# Patient Record
Sex: FEMALE | Race: WHITE | Employment: FULL TIME | URBAN - METROPOLITAN AREA
[De-identification: names, ages, dates, MRNs, and addresses within clinical notes are randomized per-mention and may not be internally consistent; named-entity substitution may affect disease eponyms.]

---

## 2017-02-06 ENCOUNTER — OFFICE VISIT (OUTPATIENT)
Dept: FAMILY MEDICINE CLINIC | Facility: CLINIC | Age: 42
End: 2017-02-06

## 2017-02-06 VITALS
HEIGHT: 63 IN | TEMPERATURE: 99 F | WEIGHT: 157.19 LBS | DIASTOLIC BLOOD PRESSURE: 70 MMHG | SYSTOLIC BLOOD PRESSURE: 120 MMHG | BODY MASS INDEX: 27.85 KG/M2 | HEART RATE: 64 BPM

## 2017-02-06 DIAGNOSIS — E55.9 VITAMIN D DEFICIENCY: ICD-10-CM

## 2017-02-06 DIAGNOSIS — Z00.00 ENCOUNTER FOR ROUTINE ADULT HEALTH EXAMINATION WITHOUT ABNORMAL FINDINGS: ICD-10-CM

## 2017-02-06 DIAGNOSIS — E78.49 FAMILIAL MULTIPLE LIPOPROTEIN-TYPE HYPERLIPIDEMIA: Primary | ICD-10-CM

## 2017-02-06 DIAGNOSIS — D50.9 IRON DEFICIENCY ANEMIA, UNSPECIFIED IRON DEFICIENCY ANEMIA TYPE: ICD-10-CM

## 2017-02-06 PROBLEM — N88.8 CYST OF CERVIX: Status: ACTIVE | Noted: 2017-02-06

## 2017-02-06 PROCEDURE — 99396 PREV VISIT EST AGE 40-64: CPT | Performed by: FAMILY MEDICINE

## 2017-02-06 PROCEDURE — 90715 TDAP VACCINE 7 YRS/> IM: CPT | Performed by: FAMILY MEDICINE

## 2017-02-06 PROCEDURE — 93000 ELECTROCARDIOGRAM COMPLETE: CPT | Performed by: FAMILY MEDICINE

## 2017-02-06 PROCEDURE — 90471 IMMUNIZATION ADMIN: CPT | Performed by: FAMILY MEDICINE

## 2017-02-06 RX ORDER — ATORVASTATIN CALCIUM 10 MG/1
1 TABLET, FILM COATED ORAL DAILY
COMMUNITY
Start: 2016-06-23 | End: 2017-02-11

## 2017-02-06 RX ORDER — CHLORAL HYDRATE 500 MG
1 CAPSULE ORAL DAILY
COMMUNITY
Start: 2015-09-28 | End: 2019-09-16

## 2017-02-06 RX ORDER — FERROUS SULFATE 325(65) MG
1 TABLET ORAL 2 TIMES DAILY
COMMUNITY
Start: 2015-10-08

## 2017-02-06 RX ORDER — ALPHA LIPOIC ACID 200 MG
1 CAPSULE ORAL DAILY
COMMUNITY
Start: 2015-10-08 | End: 2020-07-28

## 2017-02-06 RX ORDER — MELATONIN
1 DAILY
COMMUNITY
Start: 2015-09-28 | End: 2018-04-02

## 2017-02-06 NOTE — PATIENT INSTRUCTIONS
Call [facility] to schedule a Mammogram at [phone]   Schedule labs. Tetanus today. Refer to [facility] [phone] for colonosocpy due to higher risk.

## 2017-02-06 NOTE — PROGRESS NOTES
[facility] [place]  PROGRESS NOTE        HPI:   This is a 39year old female coming in for Patient presents with:  Physical    HPI    No results found for this or any previous visit.     Past Medical History   Diagnosis Date   • Allergic rhinitis Administered    TDAP                  02/06/2017    Problem List:  Patient Active Problem List:     Cyst of cervix     Familial multiple lipoprotein-type hyperlipidemia     Iron deficiency anemia     B12 deficiency     Vitamin D deficiency      REVIEW OF S Oriented times three,cranial nerves are intact,motor and sensory are grossly intact      ASSESSMENT AND PLAN:   1. Familial multiple lipoprotein-type hyperlipidemia  Await labs. Refill meds. Continue present management.   - CBC W Differential W Platelet [ any questions, complications, allergies, or worsening or changing symptoms. Parent is to call with any side effects or complications from the treatments as a result of today.        Wanda Abarca MD  2/6/2017  2:56 PM    There is no immunization history on

## 2017-02-11 RX ORDER — ATORVASTATIN CALCIUM 10 MG/1
TABLET, FILM COATED ORAL
Qty: 30 TABLET | Refills: 0 | Status: SHIPPED | OUTPATIENT
Start: 2017-02-11 | End: 2017-03-14

## 2017-02-11 NOTE — TELEPHONE ENCOUNTER
Patient has lab appt on 2/18/17. Order are in chart. One refill given okay per Dr. Guero Finch.  Rachel Roldan, 02/11/2017, 11:16 AM

## 2017-02-11 NOTE — TELEPHONE ENCOUNTER
Pt is due for follow up appointment. Please have pt  Schedule apptointment . Refills until appointmentt ok.       Nayana Frank MD

## 2017-02-18 ENCOUNTER — LAB ENCOUNTER (OUTPATIENT)
Dept: LAB | Age: 42
End: 2017-02-18
Attending: FAMILY MEDICINE
Payer: COMMERCIAL

## 2017-02-18 DIAGNOSIS — D50.9 IRON DEFICIENCY ANEMIA, UNSPECIFIED IRON DEFICIENCY ANEMIA TYPE: ICD-10-CM

## 2017-02-18 DIAGNOSIS — E78.49 FAMILIAL MULTIPLE LIPOPROTEIN-TYPE HYPERLIPIDEMIA: ICD-10-CM

## 2017-02-18 DIAGNOSIS — E55.9 VITAMIN D DEFICIENCY: ICD-10-CM

## 2017-02-18 LAB
25-HYDROXYVITAMIN D (TOTAL): 29.8 NG/ML (ref 30–100)
ALBUMIN SERPL-MCNC: 3.9 G/DL (ref 3.5–4.8)
ALP LIVER SERPL-CCNC: 49 U/L (ref 37–98)
ALT SERPL-CCNC: 36 U/L (ref 14–54)
AST SERPL-CCNC: 13 U/L (ref 15–41)
BASOPHILS # BLD AUTO: 0.05 X10(3) UL (ref 0–0.1)
BASOPHILS NFR BLD AUTO: 0.7 %
BILIRUB SERPL-MCNC: 0.6 MG/DL (ref 0.1–2)
BILIRUB UR QL STRIP.AUTO: NEGATIVE
BUN BLD-MCNC: 12 MG/DL (ref 8–20)
CALCIUM BLD-MCNC: 8.6 MG/DL (ref 8.3–10.3)
CHLORIDE: 104 MMOL/L (ref 101–111)
CHOLEST SMN-MCNC: 182 MG/DL (ref ?–200)
CK: 82 IU/L (ref 26–192)
CLARITY UR REFRACT.AUTO: CLEAR
CO2: 26 MMOL/L (ref 22–32)
CREAT BLD-MCNC: 0.74 MG/DL (ref 0.55–1.02)
DEPRECATED HBV CORE AB SER IA-ACNC: 47 NG/ML (ref 10–291)
EOSINOPHIL # BLD AUTO: 0.22 X10(3) UL (ref 0–0.3)
EOSINOPHIL NFR BLD AUTO: 3.1 %
ERYTHROCYTE [DISTWIDTH] IN BLOOD BY AUTOMATED COUNT: 13.1 % (ref 11.5–16)
GLUCOSE BLD-MCNC: 85 MG/DL (ref 70–99)
GLUCOSE UR STRIP.AUTO-MCNC: NEGATIVE MG/DL
HAV AB SERPL IA-ACNC: 615 PG/ML (ref 193–986)
HCT VFR BLD AUTO: 36.4 % (ref 34–50)
HDLC SERPL-MCNC: 62 MG/DL (ref 45–?)
HDLC SERPL: 2.94 {RATIO} (ref ?–4.44)
HGB BLD-MCNC: 12.7 G/DL (ref 12–16)
IMMATURE GRANULOCYTE COUNT: 0.01 X10(3) UL (ref 0–1)
IMMATURE GRANULOCYTE RATIO %: 0.1 %
IRON SATURATION: 29 % (ref 13–45)
IRON: 110 UG/DL (ref 28–170)
KETONES UR STRIP.AUTO-MCNC: NEGATIVE MG/DL
LDLC SERPL CALC-MCNC: 92 MG/DL (ref ?–130)
LEUKOCYTE ESTERASE UR QL STRIP.AUTO: NEGATIVE
LYMPHOCYTES # BLD AUTO: 2.45 X10(3) UL (ref 0.9–4)
LYMPHOCYTES NFR BLD AUTO: 34.3 %
M PROTEIN MFR SERPL ELPH: 7.2 G/DL (ref 6.1–8.3)
MCH RBC QN AUTO: 30.2 PG (ref 27–33.2)
MCHC RBC AUTO-ENTMCNC: 34.9 G/DL (ref 31–37)
MCV RBC AUTO: 86.5 FL (ref 81–100)
MONOCYTES # BLD AUTO: 0.4 X10(3) UL (ref 0.1–0.6)
MONOCYTES NFR BLD AUTO: 5.6 %
NEUTROPHIL ABS PRELIM: 4.01 X10 (3) UL (ref 1.3–6.7)
NEUTROPHILS # BLD AUTO: 4.01 X10(3) UL (ref 1.3–6.7)
NEUTROPHILS NFR BLD AUTO: 56.2 %
NITRITE UR QL STRIP.AUTO: NEGATIVE
NONHDLC SERPL-MCNC: 120 MG/DL (ref ?–130)
PH UR STRIP.AUTO: 6 [PH] (ref 4.5–8)
PLATELET # BLD AUTO: 196 10(3)UL (ref 150–450)
POTASSIUM SERPL-SCNC: 3.8 MMOL/L (ref 3.6–5.1)
PROT UR STRIP.AUTO-MCNC: NEGATIVE MG/DL
RBC # BLD AUTO: 4.21 X10(6)UL (ref 3.8–5.1)
RBC UR QL AUTO: NEGATIVE
RED CELL DISTRIBUTION WIDTH-SD: 40.5 FL (ref 35.1–46.3)
SODIUM SERPL-SCNC: 139 MMOL/L (ref 136–144)
SP GR UR STRIP.AUTO: 1.01 (ref 1–1.03)
TOTAL IRON BINDING CAPACITY: 374 UG/DL (ref 298–536)
TRANSFERRIN: 251 MG/DL (ref 200–360)
TRIGLYCERIDES: 141 MG/DL (ref ?–150)
TSI SER-ACNC: 1.1 MIU/ML (ref 0.35–5.5)
URIC ACID: 3.7 MG/DL (ref 2.4–8)
UROBILINOGEN UR STRIP.AUTO-MCNC: <2 MG/DL
VLDL: 28 MG/DL (ref 5–40)
WBC # BLD AUTO: 7.1 X10(3) UL (ref 4–13)

## 2017-02-18 PROCEDURE — 84550 ASSAY OF BLOOD/URIC ACID: CPT

## 2017-02-18 PROCEDURE — 80053 COMPREHEN METABOLIC PANEL: CPT

## 2017-02-18 PROCEDURE — 81003 URINALYSIS AUTO W/O SCOPE: CPT

## 2017-02-18 PROCEDURE — 83540 ASSAY OF IRON: CPT

## 2017-02-18 PROCEDURE — 82607 VITAMIN B-12: CPT

## 2017-02-18 PROCEDURE — 85025 COMPLETE CBC W/AUTO DIFF WBC: CPT

## 2017-02-18 PROCEDURE — 84443 ASSAY THYROID STIM HORMONE: CPT

## 2017-02-18 PROCEDURE — 83550 IRON BINDING TEST: CPT

## 2017-02-18 PROCEDURE — 82550 ASSAY OF CK (CPK): CPT

## 2017-02-18 PROCEDURE — 82728 ASSAY OF FERRITIN: CPT

## 2017-02-18 PROCEDURE — 80061 LIPID PANEL: CPT

## 2017-02-18 PROCEDURE — 82306 VITAMIN D 25 HYDROXY: CPT

## 2017-02-20 ENCOUNTER — TELEPHONE (OUTPATIENT)
Dept: FAMILY MEDICINE CLINIC | Facility: CLINIC | Age: 42
End: 2017-02-20

## 2017-02-20 DIAGNOSIS — E55.9 VITAMIN D DEFICIENCY: Primary | ICD-10-CM

## 2017-02-20 NOTE — TELEPHONE ENCOUNTER
----- Message from Jamal Vargas MD sent at 2/20/2017  8:53 AM CST -----  Vitamin D is sub therapeutic. Recommend 2000 units of vitamin D daily  Recheck vitamin D level in 6  months. Otherwise labs look great!

## 2017-02-20 NOTE — TELEPHONE ENCOUNTER
Patient notified of results and recommendations and expressed understanding. Order in for repeat b/w.     Leno Roldan, 02/20/2017, 12:13 PM

## 2017-03-14 RX ORDER — ATORVASTATIN CALCIUM 10 MG/1
TABLET, FILM COATED ORAL
Qty: 30 TABLET | Refills: 5 | Status: SHIPPED | OUTPATIENT
Start: 2017-03-14 | End: 2017-10-02

## 2017-05-09 ENCOUNTER — TELEPHONE (OUTPATIENT)
Dept: FAMILY MEDICINE CLINIC | Facility: CLINIC | Age: 42
End: 2017-05-09

## 2017-05-10 NOTE — TELEPHONE ENCOUNTER
Unable to add colonoscopy to the flowsheet as there is not one  Dr. Jolene Torres was able to add to patient's health maintenance  Note closed    Pat See Jamar, 05/10/2017, 8:45 AM

## 2017-10-02 RX ORDER — ATORVASTATIN CALCIUM 10 MG/1
TABLET, FILM COATED ORAL
Qty: 30 TABLET | Refills: 0 | Status: SHIPPED | OUTPATIENT
Start: 2017-10-02 | End: 2017-10-30

## 2017-10-02 NOTE — TELEPHONE ENCOUNTER
Pt is due for follow up appointment and fasting labs. Please have pt  Schedule apptointment . Refills until appointmentt ok.     Diallo Barboza MD

## 2017-10-02 NOTE — TELEPHONE ENCOUNTER
Future appt:    Last Appointment:  Visit date not found  Last ov with pcp on 2/6/17     Cholesterol, Total (mg/dL)   Date Value   02/18/2017 182   ----------  HDL Cholesterol (mg/dL)   Date Value   02/18/2017 62   ----------  LDL Cholesterol (mg/dL)   Date

## 2017-10-04 NOTE — TELEPHONE ENCOUNTER
Patient informed of the below results and recommendations. Patient will c/b to schedule the appropriate appts.

## 2017-10-23 ENCOUNTER — APPOINTMENT (OUTPATIENT)
Dept: LAB | Age: 42
End: 2017-10-23
Attending: FAMILY MEDICINE
Payer: COMMERCIAL

## 2017-10-23 DIAGNOSIS — E55.9 VITAMIN D DEFICIENCY: ICD-10-CM

## 2017-10-23 PROCEDURE — 82306 VITAMIN D 25 HYDROXY: CPT | Performed by: FAMILY MEDICINE

## 2017-10-23 PROCEDURE — 36415 COLL VENOUS BLD VENIPUNCTURE: CPT | Performed by: FAMILY MEDICINE

## 2017-10-24 ENCOUNTER — TELEPHONE (OUTPATIENT)
Dept: FAMILY MEDICINE CLINIC | Facility: CLINIC | Age: 42
End: 2017-10-24

## 2017-10-24 DIAGNOSIS — E55.9 VITAMIN D DEFICIENCY: Primary | ICD-10-CM

## 2017-10-24 NOTE — TELEPHONE ENCOUNTER
Patient notified of results and recommendations and expressed understanding.     Guero Roldan, 10/24/17, 10:45 AM

## 2017-10-24 NOTE — TELEPHONE ENCOUNTER
----- Message from Ag Apodaca MD sent at 10/24/2017  8:35 AM CDT -----  Improving. Continue present managment   Recheck in 1 year.

## 2017-10-30 RX ORDER — ATORVASTATIN CALCIUM 10 MG/1
TABLET, FILM COATED ORAL
Qty: 30 TABLET | Refills: 0 | Status: SHIPPED | OUTPATIENT
Start: 2017-10-30 | End: 2017-12-03

## 2017-10-30 NOTE — TELEPHONE ENCOUNTER
Future appt:    Last Appointment:  2/6/17 with Dr. Janie Perrin for physical    Cholesterol, Total (mg/dL)   Date Value   02/18/2017 182   ----------  HDL Cholesterol (mg/dL)   Date Value   02/18/2017 62   ----------  LDL Cholesterol (mg/dL)   Date Value   02/18/2

## 2017-10-30 NOTE — TELEPHONE ENCOUNTER
Pt is due for follow up appointment. Please have pt  Schedule apptointment . Refills until appointmentt ok.     Faina Escalona MD

## 2017-12-04 RX ORDER — ATORVASTATIN CALCIUM 10 MG/1
TABLET, FILM COATED ORAL
Qty: 30 TABLET | Refills: 0 | Status: SHIPPED | OUTPATIENT
Start: 2017-12-04 | End: 2018-01-11

## 2017-12-04 NOTE — TELEPHONE ENCOUNTER
Future appt:     Your appointments     Date & Time Appointment Department [facility])    Dec 13, 2017  3:15 PM CST Physical - Established Patient with Cesia Isbell MD [address] [facility] [place] ([facility])        Le Mckee

## 2018-01-11 RX ORDER — ATORVASTATIN CALCIUM 10 MG/1
TABLET, FILM COATED ORAL
Qty: 30 TABLET | Refills: 1 | Status: SHIPPED | OUTPATIENT
Start: 2018-01-11 | End: 2018-03-17

## 2018-01-11 NOTE — TELEPHONE ENCOUNTER
Dr. Janie Perrin is out of the office today. Refill done. Please let patient know that she is due for recheck in February.    Wendi Agrawal, 01/11/18, 11:03 AM

## 2018-01-11 NOTE — TELEPHONE ENCOUNTER
Future appt:  None  Last Appointment:  2/6/2017; No f/u recommended    Cholesterol, Total (mg/dL)   Date Value   02/18/2017 182   ----------  HDL Cholesterol (mg/dL)   Date Value   02/18/2017 62   ----------  LDL Cholesterol (mg/dL)   Date Value   02/18/20

## 2018-02-08 ENCOUNTER — TELEPHONE (OUTPATIENT)
Dept: FAMILY MEDICINE CLINIC | Facility: CLINIC | Age: 43
End: 2018-02-08

## 2018-02-08 DIAGNOSIS — E55.9 VITAMIN D DEFICIENCY: ICD-10-CM

## 2018-02-08 DIAGNOSIS — E78.49 FAMILIAL MULTIPLE LIPOPROTEIN-TYPE HYPERLIPIDEMIA: Primary | ICD-10-CM

## 2018-02-08 DIAGNOSIS — D50.9 IRON DEFICIENCY ANEMIA, UNSPECIFIED IRON DEFICIENCY ANEMIA TYPE: ICD-10-CM

## 2018-02-08 DIAGNOSIS — E53.8 B12 DEFICIENCY: ICD-10-CM

## 2018-02-08 NOTE — TELEPHONE ENCOUNTER
Needing to schedule some appts, but wants to make sure the correct things are ordered--she states last time everything was messed up. Please call back.

## 2018-03-17 RX ORDER — ATORVASTATIN CALCIUM 10 MG/1
TABLET, FILM COATED ORAL
Qty: 30 TABLET | Refills: 0 | Status: SHIPPED | OUTPATIENT
Start: 2018-03-17 | End: 2018-03-26

## 2018-03-17 NOTE — TELEPHONE ENCOUNTER
Future appt:     Your appointments     Date & Time Appointment Department [facility])    Mar 26, 2018  8:45 AM CDT Laboratory Visit with REF [facility] ([facility] [place])    Apr 02, 2018  3:30 PM CDT Physical - Established Patient with Sabrina Boyce

## 2018-03-26 ENCOUNTER — TELEPHONE (OUTPATIENT)
Dept: FAMILY MEDICINE CLINIC | Facility: CLINIC | Age: 43
End: 2018-03-26

## 2018-03-26 ENCOUNTER — LABORATORY ENCOUNTER (OUTPATIENT)
Dept: LAB | Age: 43
End: 2018-03-26
Attending: FAMILY MEDICINE
Payer: COMMERCIAL

## 2018-03-26 DIAGNOSIS — E78.49 FAMILIAL MULTIPLE LIPOPROTEIN-TYPE HYPERLIPIDEMIA: ICD-10-CM

## 2018-03-26 DIAGNOSIS — E55.9 VITAMIN D DEFICIENCY: Primary | ICD-10-CM

## 2018-03-26 DIAGNOSIS — D50.9 IRON DEFICIENCY ANEMIA, UNSPECIFIED IRON DEFICIENCY ANEMIA TYPE: ICD-10-CM

## 2018-03-26 DIAGNOSIS — E55.9 VITAMIN D DEFICIENCY: ICD-10-CM

## 2018-03-26 DIAGNOSIS — E78.5 HYPERLIPIDEMIA, UNSPECIFIED HYPERLIPIDEMIA TYPE: ICD-10-CM

## 2018-03-26 DIAGNOSIS — R74.8 ELEVATED VITAMIN B12 LEVEL: ICD-10-CM

## 2018-03-26 DIAGNOSIS — E53.8 B12 DEFICIENCY: ICD-10-CM

## 2018-03-26 LAB
25-HYDROXYVITAMIN D (TOTAL): 29.1 NG/ML (ref 30–100)
ALBUMIN SERPL-MCNC: 4 G/DL (ref 3.5–4.8)
ALP LIVER SERPL-CCNC: 70 U/L (ref 37–98)
ALT SERPL-CCNC: 22 U/L (ref 14–54)
AST SERPL-CCNC: 13 U/L (ref 15–41)
BASOPHILS # BLD AUTO: 0.05 X10(3) UL (ref 0–0.1)
BASOPHILS NFR BLD AUTO: 0.7 %
BILIRUB SERPL-MCNC: 0.6 MG/DL (ref 0.1–2)
BILIRUB UR QL STRIP.AUTO: NEGATIVE
BUN BLD-MCNC: 10 MG/DL (ref 8–20)
CALCIUM BLD-MCNC: 9.1 MG/DL (ref 8.3–10.3)
CHLORIDE: 102 MMOL/L (ref 101–111)
CHOLEST SMN-MCNC: 202 MG/DL (ref ?–200)
CLARITY UR REFRACT.AUTO: CLEAR
CO2: 31 MMOL/L (ref 22–32)
COLOR UR AUTO: YELLOW
CREAT BLD-MCNC: 0.8 MG/DL (ref 0.55–1.02)
DEPRECATED HBV CORE AB SER IA-ACNC: 37.3 NG/ML (ref 10–291)
EOSINOPHIL # BLD AUTO: 0.27 X10(3) UL (ref 0–0.3)
EOSINOPHIL NFR BLD AUTO: 3.8 %
ERYTHROCYTE [DISTWIDTH] IN BLOOD BY AUTOMATED COUNT: 13.2 % (ref 11.5–16)
GLUCOSE BLD-MCNC: 101 MG/DL (ref 70–99)
GLUCOSE UR STRIP.AUTO-MCNC: NEGATIVE MG/DL
HAV AB SERPL IA-ACNC: >2000 PG/ML (ref 193–986)
HCT VFR BLD AUTO: 43.4 % (ref 34–50)
HDLC SERPL-MCNC: 58 MG/DL (ref 45–?)
HDLC SERPL: 3.48 {RATIO} (ref ?–4.44)
HGB BLD-MCNC: 14.1 G/DL (ref 12–16)
IMMATURE GRANULOCYTE COUNT: 0.02 X10(3) UL (ref 0–1)
IMMATURE GRANULOCYTE RATIO %: 0.3 %
KETONES UR STRIP.AUTO-MCNC: NEGATIVE MG/DL
LDLC SERPL CALC-MCNC: 108 MG/DL (ref ?–130)
LEUKOCYTE ESTERASE UR QL STRIP.AUTO: NEGATIVE
LYMPHOCYTES # BLD AUTO: 2.04 X10(3) UL (ref 0.9–4)
LYMPHOCYTES NFR BLD AUTO: 28.9 %
M PROTEIN MFR SERPL ELPH: 7.8 G/DL (ref 6.1–8.3)
MCH RBC QN AUTO: 28.6 PG (ref 27–33.2)
MCHC RBC AUTO-ENTMCNC: 32.5 G/DL (ref 31–37)
MCV RBC AUTO: 88 FL (ref 81–100)
MONOCYTES # BLD AUTO: 0.37 X10(3) UL (ref 0.1–1)
MONOCYTES NFR BLD AUTO: 5.2 %
NEUTROPHIL ABS PRELIM: 4.32 X10 (3) UL (ref 1.3–6.7)
NEUTROPHILS # BLD AUTO: 4.32 X10(3) UL (ref 1.3–6.7)
NEUTROPHILS NFR BLD AUTO: 61.1 %
NITRITE UR QL STRIP.AUTO: NEGATIVE
NONHDLC SERPL-MCNC: 144 MG/DL (ref ?–130)
PH UR STRIP.AUTO: 7 [PH] (ref 4.5–8)
PLATELET # BLD AUTO: 266 10(3)UL (ref 150–450)
POTASSIUM SERPL-SCNC: 3.5 MMOL/L (ref 3.6–5.1)
PROT UR STRIP.AUTO-MCNC: NEGATIVE MG/DL
RBC # BLD AUTO: 4.93 X10(6)UL (ref 3.8–5.1)
RBC UR QL AUTO: NEGATIVE
RED CELL DISTRIBUTION WIDTH-SD: 42.8 FL (ref 35.1–46.3)
SODIUM SERPL-SCNC: 137 MMOL/L (ref 136–144)
SP GR UR STRIP.AUTO: 1.01 (ref 1–1.03)
TRIGL SERPL-MCNC: 179 MG/DL (ref ?–150)
TSI SER-ACNC: 0.79 MIU/ML (ref 0.35–5.5)
UROBILINOGEN UR STRIP.AUTO-MCNC: <2 MG/DL
VLDLC SERPL CALC-MCNC: 36 MG/DL (ref 5–40)
WBC # BLD AUTO: 7.1 X10(3) UL (ref 4–13)

## 2018-03-26 PROCEDURE — 82728 ASSAY OF FERRITIN: CPT | Performed by: FAMILY MEDICINE

## 2018-03-26 PROCEDURE — 80050 GENERAL HEALTH PANEL: CPT | Performed by: FAMILY MEDICINE

## 2018-03-26 PROCEDURE — 82607 VITAMIN B-12: CPT | Performed by: FAMILY MEDICINE

## 2018-03-26 PROCEDURE — 36415 COLL VENOUS BLD VENIPUNCTURE: CPT | Performed by: FAMILY MEDICINE

## 2018-03-26 PROCEDURE — 80061 LIPID PANEL: CPT | Performed by: FAMILY MEDICINE

## 2018-03-26 PROCEDURE — 82306 VITAMIN D 25 HYDROXY: CPT | Performed by: FAMILY MEDICINE

## 2018-03-26 PROCEDURE — 81003 URINALYSIS AUTO W/O SCOPE: CPT | Performed by: FAMILY MEDICINE

## 2018-03-26 RX ORDER — ATORVASTATIN CALCIUM 20 MG/1
20 TABLET, FILM COATED ORAL
Qty: 30 TABLET | Refills: 3 | Status: SHIPPED | OUTPATIENT
Start: 2018-03-26 | End: 2018-09-05

## 2018-03-26 NOTE — TELEPHONE ENCOUNTER
Patient notified of results and recommendations and expressed understanding.   Order in for recheck vitamin levels in 6 months    Patient states she is taking her Lipitor every day    John Roldan, 03/26/18, 5:25 PM

## 2018-03-26 NOTE — TELEPHONE ENCOUNTER
----- Message from Ryder Herrera MD sent at 3/26/2018  4:50 PM CDT -----  b12 is high. Reduce supplementation to every other day. Slightly low potassium. Recommend increase bananas, potassium rich food.    Cholesterol is high, is she taking her lipito

## 2018-03-26 NOTE — TELEPHONE ENCOUNTER
----- Message from Shanthi Ryder MD sent at 3/26/2018  3:37 PM CDT -----  Vitamin D is sub therapeutic. Recommend 2000 units of vitamin D daily  Recheck vitamin D level in 6  months.

## 2018-04-02 ENCOUNTER — OFFICE VISIT (OUTPATIENT)
Dept: FAMILY MEDICINE CLINIC | Facility: CLINIC | Age: 43
End: 2018-04-02

## 2018-04-02 VITALS
RESPIRATION RATE: 14 BRPM | DIASTOLIC BLOOD PRESSURE: 68 MMHG | SYSTOLIC BLOOD PRESSURE: 124 MMHG | OXYGEN SATURATION: 100 % | HEART RATE: 74 BPM | WEIGHT: 162 LBS | BODY MASS INDEX: 28.7 KG/M2 | TEMPERATURE: 98 F | HEIGHT: 63 IN

## 2018-04-02 DIAGNOSIS — E78.49 FAMILIAL MULTIPLE LIPOPROTEIN-TYPE HYPERLIPIDEMIA: Primary | ICD-10-CM

## 2018-04-02 DIAGNOSIS — E01.0 THYROMEGALY: ICD-10-CM

## 2018-04-02 DIAGNOSIS — D50.9 IRON DEFICIENCY ANEMIA, UNSPECIFIED IRON DEFICIENCY ANEMIA TYPE: ICD-10-CM

## 2018-04-02 DIAGNOSIS — N84.1 CERVICAL POLYP: ICD-10-CM

## 2018-04-02 DIAGNOSIS — K90.41 GLUTEN-SENSITIVE ENTEROPATHY: ICD-10-CM

## 2018-04-02 DIAGNOSIS — Z00.00 ENCOUNTER FOR HEALTH MAINTENANCE EXAMINATION IN ADULT: ICD-10-CM

## 2018-04-02 PROBLEM — K21.00 GASTROESOPHAGEAL REFLUX DISEASE WITH ESOPHAGITIS: Status: ACTIVE | Noted: 2018-04-02

## 2018-04-02 PROBLEM — K22.70 BARRETT'S ESOPHAGUS WITHOUT DYSPLASIA: Status: ACTIVE | Noted: 2018-04-02

## 2018-04-02 PROCEDURE — 87624 HPV HI-RISK TYP POOLED RSLT: CPT | Performed by: FAMILY MEDICINE

## 2018-04-02 PROCEDURE — 88175 CYTOPATH C/V AUTO FLUID REDO: CPT | Performed by: FAMILY MEDICINE

## 2018-04-02 PROCEDURE — 99396 PREV VISIT EST AGE 40-64: CPT | Performed by: FAMILY MEDICINE

## 2018-04-02 PROCEDURE — 57500 BIOPSY OF CERVIX: CPT | Performed by: FAMILY MEDICINE

## 2018-04-02 RX ORDER — OMEPRAZOLE 20 MG/1
1 CAPSULE, DELAYED RELEASE ORAL AS NEEDED
Refills: 1 | COMMUNITY
Start: 2018-01-18 | End: 2018-11-29

## 2018-04-02 NOTE — PATIENT INSTRUCTIONS
[employer]. net  (0528 access number) for vitamins   Vitamin D is very low,   Recheck in 3 months. Schedule thyroid US. If thyroid US is abnormal may want to get labs earlier.

## 2018-04-02 NOTE — PROGRESS NOTES
[facility] [place]  PROGRESS NOTE        HPI:   This is a 43year old female coming in for Patient presents with:  Physical    HPI   Has been seeing Dr. Mildred Lynn and has a gluten sensitivity and Bowman's esophagitis. Was started on Prilosec.    Ata Jimenez Negative   Ketones Urine Negative Negative mg/dL   Blood Urine Negative Negative   pH Urine 7.0 4.5 - 8.0   Protein Urine Negative Negative mg/dl   Urobilinogen Urine <2.0 0.2 - 2.0 mg/dL   Nitrite Urine Negative Negative   Leukocyte Esterase Urine Negativ of Children: 3    Oriental orthodox/ Uatsdin: Episcopal    Other Social History Comments:           Smoking status: Never Smoker                                                              Smokeless tobacco: Never Used                      Alcohol use:  No 124/68 (BP Location: Left arm, Patient Position: Sitting, Cuff Size: adult)   Pulse 74   Temp 98.2 °F (36.8 °C) (Temporal)   Resp 14   Ht 63\"   Wt 162 lb   LMP 03/25/2018   SpO2 100%   BMI 28.70 kg/m²  Estimated body mass index is 28.7 kg/m² as calculated THYROGLOBULIN ANTIBODIES; Future  -     URIC ACID, SERUM; Future  -     TSH W REFLEX TO FREE T4; Future   Check genetic component of gluten sensitivity. Thyroid US. Cervical polyp  -     SURGICAL PATHOLOGY TISSUE;  Future      Vitamin d, cmp, ferriti

## 2018-04-05 ENCOUNTER — TELEPHONE (OUTPATIENT)
Dept: FAMILY MEDICINE CLINIC | Facility: CLINIC | Age: 43
End: 2018-04-05

## 2018-04-05 DIAGNOSIS — E01.0 THYROMEGALY: Primary | ICD-10-CM

## 2018-04-05 NOTE — TELEPHONE ENCOUNTER
----- Message from Sole Eden MD sent at 4/3/2018  6:27 PM CDT -----  Normal (hpv) results, please notify patient. \

## 2018-04-05 NOTE — TELEPHONE ENCOUNTER
HPV and Thinprep Pap both normal / negative  Pathology for Cervical Polyp was normal / negative - no malignancy    Patient notified of results and recommendations and expressed understanding.     Cortney Roldan, 04/05/18, 10:45 AM    Patient states she is sup

## 2018-04-23 RX ORDER — ATORVASTATIN CALCIUM 10 MG/1
TABLET, FILM COATED ORAL
Qty: 30 TABLET | Refills: 0 | OUTPATIENT
Start: 2018-04-23

## 2018-04-24 ENCOUNTER — HOSPITAL ENCOUNTER (OUTPATIENT)
Dept: ULTRASOUND IMAGING | Age: 43
Discharge: HOME | End: 2018-04-24
Attending: FAMILY MEDICINE
Payer: COMMERCIAL

## 2018-04-24 ENCOUNTER — TELEPHONE (OUTPATIENT)
Dept: FAMILY MEDICINE CLINIC | Facility: CLINIC | Age: 43
End: 2018-04-24

## 2018-04-24 DIAGNOSIS — E01.0 THYROMEGALY: ICD-10-CM

## 2018-04-24 DIAGNOSIS — E04.1 THYROID NODULE: Primary | ICD-10-CM

## 2018-04-24 PROCEDURE — 76536 US EXAM OF HEAD AND NECK: CPT | Performed by: FAMILY MEDICINE

## 2018-04-24 NOTE — TELEPHONE ENCOUNTER
----- Message from GERMAINE Flores sent at 4/24/2018  1:54 PM CDT -----  Dr. Bolanos Goes is out of the office today. Please have patient see surgeon for a consult for the nodules. Recommend Dr. Chelita Keys or a surgeon of her choice within her insurance.

## 2018-04-27 NOTE — TELEPHONE ENCOUNTER
Patient notified of results and recommendations and expressed understanding.   Referral into chart and faxed to Dr. Catrachito Samaniego with ultrasound report    Guero Roldan, 04/27/18, 8:11 AM

## 2018-05-25 ENCOUNTER — TELEPHONE (OUTPATIENT)
Dept: FAMILY MEDICINE CLINIC | Facility: CLINIC | Age: 43
End: 2018-05-25

## 2018-05-25 NOTE — TELEPHONE ENCOUNTER
Patient received letter for labs that were due in April  Patient also due for labs in June  See lab orders  Patient has lab appt 7/6/8  Patient advised we can do all labs at that appointment  Patient expressed understanding  Patient will come to appointmen

## 2018-07-06 ENCOUNTER — APPOINTMENT (OUTPATIENT)
Dept: LAB | Age: 43
End: 2018-07-06
Attending: FAMILY MEDICINE
Payer: COMMERCIAL

## 2018-07-06 DIAGNOSIS — E78.5 HYPERLIPIDEMIA, UNSPECIFIED HYPERLIPIDEMIA TYPE: ICD-10-CM

## 2018-07-06 DIAGNOSIS — E01.0 THYROMEGALY: ICD-10-CM

## 2018-07-06 DIAGNOSIS — K90.41 GLUTEN-SENSITIVE ENTEROPATHY: ICD-10-CM

## 2018-07-06 LAB
ALBUMIN SERPL-MCNC: 3.7 G/DL (ref 3.5–4.8)
ALP LIVER SERPL-CCNC: 58 U/L (ref 37–98)
ALT SERPL-CCNC: 25 U/L (ref 14–54)
ANTI-THYROGLOBULIN: 21 U/ML (ref ?–60)
ANTI-THYROPEROXIDASE: <28 U/ML (ref ?–60)
AST SERPL-CCNC: 13 U/L (ref 15–41)
BILIRUB SERPL-MCNC: 0.5 MG/DL (ref 0.1–2)
BUN BLD-MCNC: 12 MG/DL (ref 8–20)
CALCIUM BLD-MCNC: 8.8 MG/DL (ref 8.3–10.3)
CHLORIDE: 106 MMOL/L (ref 101–111)
CHOLEST SMN-MCNC: 193 MG/DL (ref ?–200)
CK: 77 IU/L (ref 26–192)
CO2: 26 MMOL/L (ref 22–32)
CREAT BLD-MCNC: 0.77 MG/DL (ref 0.55–1.02)
GLUCOSE BLD-MCNC: 90 MG/DL (ref 70–99)
HDLC SERPL-MCNC: 54 MG/DL (ref 45–?)
HDLC SERPL: 3.57 {RATIO} (ref ?–4.44)
LDLC SERPL CALC-MCNC: 99 MG/DL (ref ?–130)
M PROTEIN MFR SERPL ELPH: 7.2 G/DL (ref 6.1–8.3)
NONHDLC SERPL-MCNC: 139 MG/DL (ref ?–130)
POTASSIUM SERPL-SCNC: 4.2 MMOL/L (ref 3.6–5.1)
SODIUM SERPL-SCNC: 139 MMOL/L (ref 136–144)
TRIGL SERPL-MCNC: 198 MG/DL (ref ?–150)
TSI SER-ACNC: 1.27 MIU/ML (ref 0.35–5.5)
URIC ACID: 4.2 MG/DL (ref 2.4–8)
VLDLC SERPL CALC-MCNC: 40 MG/DL (ref 5–40)

## 2018-07-06 PROCEDURE — 84550 ASSAY OF BLOOD/URIC ACID: CPT | Performed by: FAMILY MEDICINE

## 2018-07-06 PROCEDURE — 82550 ASSAY OF CK (CPK): CPT | Performed by: FAMILY MEDICINE

## 2018-07-06 PROCEDURE — 86800 THYROGLOBULIN ANTIBODY: CPT | Performed by: FAMILY MEDICINE

## 2018-07-06 PROCEDURE — 84443 ASSAY THYROID STIM HORMONE: CPT | Performed by: FAMILY MEDICINE

## 2018-07-06 PROCEDURE — 36415 COLL VENOUS BLD VENIPUNCTURE: CPT | Performed by: FAMILY MEDICINE

## 2018-07-06 PROCEDURE — 80053 COMPREHEN METABOLIC PANEL: CPT | Performed by: FAMILY MEDICINE

## 2018-07-06 PROCEDURE — 86376 MICROSOMAL ANTIBODY EACH: CPT | Performed by: FAMILY MEDICINE

## 2018-07-06 PROCEDURE — 80061 LIPID PANEL: CPT | Performed by: FAMILY MEDICINE

## 2018-07-06 PROCEDURE — 81376 HLA II TYPING 1 LOCUS LR: CPT | Performed by: FAMILY MEDICINE

## 2018-07-07 ENCOUNTER — TELEPHONE (OUTPATIENT)
Dept: FAMILY MEDICINE CLINIC | Facility: CLINIC | Age: 43
End: 2018-07-07

## 2018-07-07 NOTE — TELEPHONE ENCOUNTER
----- Message from Korey Diaz MD sent at 7/7/2018  8:07 AM CDT -----  Normal (thyroid ) results, please notify patient. Lipids are improving.

## 2018-07-07 NOTE — TELEPHONE ENCOUNTER
Patient notified of results and recommendations and expressed understanding.     Kina Roldan, 07/07/18, 11:35 AM

## 2018-07-10 DIAGNOSIS — K90.0 TRANSIENT GLUTEN SENSITIVITY: Primary | ICD-10-CM

## 2018-07-10 LAB
CELIAC (HLA-DQ8): NEGATIVE
CELIAC (HLA-DQA1*05): POSITIVE
CELIAC (HLA-DQB1*02): POSITIVE

## 2018-09-05 DIAGNOSIS — E53.8 B12 DEFICIENCY: Primary | ICD-10-CM

## 2018-09-05 DIAGNOSIS — E55.9 VITAMIN D DEFICIENCY: ICD-10-CM

## 2018-09-06 RX ORDER — ATORVASTATIN CALCIUM 20 MG/1
TABLET, FILM COATED ORAL
Qty: 30 TABLET | Refills: 0 | Status: SHIPPED | OUTPATIENT
Start: 2018-09-06 | End: 2018-10-03

## 2018-09-06 NOTE — TELEPHONE ENCOUNTER
Future appt:     Your appointments     Date & Time Appointment Department [facility])    Sep 08, 2018  8:45 AM CDT Laboratory Visit with REF [facility] ([facility])        [facility]  [facility] [place]

## 2018-09-06 NOTE — TELEPHONE ENCOUNTER
Patient informed of the below recommendations. States she is scheduled to have labs drawn on Saturday, 9/8/2018. Patient states she has written in her notes that she needs to have Vitamin D, Vitamin B12, IGG, and cholesterol drawn.   Please place orders a

## 2018-09-06 NOTE — TELEPHONE ENCOUNTER
Pt is due for follow up appointment and fasting labs    Please have pt  Schedule apptointment according to follow up guidelines  Refills until appointmentt ok.

## 2018-09-08 ENCOUNTER — APPOINTMENT (OUTPATIENT)
Dept: LAB | Age: 43
End: 2018-09-08
Attending: FAMILY MEDICINE
Payer: COMMERCIAL

## 2018-09-08 ENCOUNTER — TELEPHONE (OUTPATIENT)
Dept: FAMILY MEDICINE CLINIC | Facility: CLINIC | Age: 43
End: 2018-09-08

## 2018-09-08 DIAGNOSIS — K90.0 TRANSIENT GLUTEN SENSITIVITY: ICD-10-CM

## 2018-09-08 DIAGNOSIS — E55.9 VITAMIN D DEFICIENCY: ICD-10-CM

## 2018-09-08 DIAGNOSIS — R74.8 ELEVATED VITAMIN B12 LEVEL: ICD-10-CM

## 2018-09-08 DIAGNOSIS — E53.8 B12 DEFICIENCY: Primary | ICD-10-CM

## 2018-09-08 DIAGNOSIS — E53.8 B12 DEFICIENCY: ICD-10-CM

## 2018-09-08 LAB
HAV AB SERPL IA-ACNC: 1034 PG/ML (ref 193–986)
VIT D+METAB SERPL-MCNC: 29.2 NG/ML (ref 30–100)

## 2018-09-08 PROCEDURE — 82306 VITAMIN D 25 HYDROXY: CPT | Performed by: FAMILY MEDICINE

## 2018-09-08 PROCEDURE — 82607 VITAMIN B-12: CPT | Performed by: FAMILY MEDICINE

## 2018-09-08 PROCEDURE — 83516 IMMUNOASSAY NONANTIBODY: CPT | Performed by: FAMILY MEDICINE

## 2018-09-08 PROCEDURE — 36415 COLL VENOUS BLD VENIPUNCTURE: CPT | Performed by: FAMILY MEDICINE

## 2018-09-08 NOTE — TELEPHONE ENCOUNTER
Dr. James Tristan is out of the office today. Lipids were done 2 months ago. Do not need to repeat today. Thank you.

## 2018-09-08 NOTE — TELEPHONE ENCOUNTER
----- Message from Moriah Serrano sent at 9/8/2018  8:56 AM CDT -----  Regarding: lab orders needed   Patient had labs today, Patient would like to know if she needed a Cholesterol test done? Patient was fasting.  If test needs to be added already have that t

## 2018-09-10 LAB
GLIAD (DEAMIDATED) AB, IGA: NEGATIVE
GLIAD (DEAMIDATED) AB, IGG: NEGATIVE

## 2018-09-10 NOTE — TELEPHONE ENCOUNTER
----- Message from Neli Hale MD sent at 9/10/2018 10:07 AM CDT -----  Vitamin D is very low,   Recommend 04954 units of vitamin D weekly x 12 weeks. Recheck vitamin D level in 3 months. Recheck vitamin b12 in 1 year.

## 2018-09-10 NOTE — TELEPHONE ENCOUNTER
Left message for patient to call office regarding lab results    Spoke with patient earlier today   Patient was upset because she had lipid panel done in July but when she asked for a refill of her statin med she was told she needed labs.  Patient states sh

## 2018-09-10 NOTE — TELEPHONE ENCOUNTER
----- Message from Brendon Cobb MD sent at 9/10/2018  3:34 PM CDT -----  Normal (gliadin) results, please notify patient.

## 2018-09-11 LAB — TISSUE TRANSGLUTAMINASE AB,IGG: 1 U/ML

## 2018-09-11 RX ORDER — ERGOCALCIFEROL 1.25 MG/1
50000 CAPSULE ORAL WEEKLY
Qty: 12 CAPSULE | Refills: 0 | Status: SHIPPED | OUTPATIENT
Start: 2018-09-11 | End: 2018-11-28

## 2018-09-11 NOTE — TELEPHONE ENCOUNTER
Patient notified of results and recommendations and expressed understanding.   Script for Vitamin D to [employer] per patient request  Order in for recheck of Vitamin D level    Apologized to patient again for misunderstanding with her labs  Patient

## 2018-09-12 ENCOUNTER — TELEPHONE (OUTPATIENT)
Dept: FAMILY MEDICINE CLINIC | Facility: CLINIC | Age: 43
End: 2018-09-12

## 2018-09-12 NOTE — TELEPHONE ENCOUNTER
----- Message from GERMAINE Flores sent at 9/12/2018  3:44 PM CDT -----  Dr. Demian Goode is out of the office today. Please let patient know that her tissue transglutaminase test is normal. Thank you.

## 2018-10-03 NOTE — TELEPHONE ENCOUNTER
Future appt:    Last Appointment:  4/2/2018; Recheck in 3 months.      Cholesterol, Total (mg/dL)   Date Value   07/06/2018 193     HDL Cholesterol (mg/dL)   Date Value   07/06/2018 54     LDL Cholesterol (mg/dL)   Date Value   07/06/2018 99     Triglycerid

## 2018-10-04 RX ORDER — ATORVASTATIN CALCIUM 20 MG/1
TABLET, FILM COATED ORAL
Qty: 30 TABLET | Refills: 0 | Status: SHIPPED | OUTPATIENT
Start: 2018-10-04 | End: 2018-11-05

## 2018-10-24 ENCOUNTER — TELEPHONE (OUTPATIENT)
Dept: FAMILY MEDICINE CLINIC | Facility: CLINIC | Age: 43
End: 2018-10-24

## 2018-10-24 NOTE — TELEPHONE ENCOUNTER
Per RF request from 10/4/2018, patient is due for a an appt and fasting labs. Patient states she just had labs drawn in September which she came fasting for but was told that it was too soon to check her cholesterol level again.   Labs prior to this was in

## 2018-10-25 NOTE — TELEPHONE ENCOUNTER
Phone call to patient. She is due for an office visit. Her labs will be due in January   Discussed with patient.

## 2018-11-05 NOTE — TELEPHONE ENCOUNTER
Last visit 4/2/18 for px   pt dues for appt    Last refill 10/4/18        Future appt:    Last Appointment:  Visit date not found  Cholesterol, Total (mg/dL)   Date Value   07/06/2018 193     HDL Cholesterol (mg/dL)   Date Value   07/06/2018 54     LDL Cho

## 2018-11-06 RX ORDER — ATORVASTATIN CALCIUM 20 MG/1
TABLET, FILM COATED ORAL
Qty: 30 TABLET | Refills: 0 | Status: SHIPPED | OUTPATIENT
Start: 2018-11-06 | End: 2018-11-29

## 2018-11-06 NOTE — TELEPHONE ENCOUNTER
Pt is due for follow up appointment in october and/or  fasting labs  cmp and lipids. Please have pt  schedule apptointment according to follow up guidelines. Refills until appointmentt ok.

## 2018-11-07 NOTE — TELEPHONE ENCOUNTER
Pt scheduled for appointment with Dr. France Escalona.      Future Appointments   Date Time Provider Rachel Felipa   11/29/2018  4:00 PM Aniceto Sorto MD [facility] [place]

## 2018-11-29 ENCOUNTER — OFFICE VISIT (OUTPATIENT)
Dept: FAMILY MEDICINE CLINIC | Facility: CLINIC | Age: 43
End: 2018-11-29
Payer: COMMERCIAL

## 2018-11-29 VITALS
DIASTOLIC BLOOD PRESSURE: 80 MMHG | WEIGHT: 162 LBS | HEIGHT: 64.5 IN | BODY MASS INDEX: 27.32 KG/M2 | RESPIRATION RATE: 16 BRPM | SYSTOLIC BLOOD PRESSURE: 122 MMHG | TEMPERATURE: 98 F | HEART RATE: 66 BPM

## 2018-11-29 DIAGNOSIS — D50.9 IRON DEFICIENCY ANEMIA, UNSPECIFIED IRON DEFICIENCY ANEMIA TYPE: ICD-10-CM

## 2018-11-29 DIAGNOSIS — E78.49 FAMILIAL MULTIPLE LIPOPROTEIN-TYPE HYPERLIPIDEMIA: ICD-10-CM

## 2018-11-29 DIAGNOSIS — K22.70 BARRETT'S ESOPHAGUS WITHOUT DYSPLASIA: ICD-10-CM

## 2018-11-29 DIAGNOSIS — E53.8 B12 DEFICIENCY: Primary | ICD-10-CM

## 2018-11-29 DIAGNOSIS — K21.00 GASTROESOPHAGEAL REFLUX DISEASE WITH ESOPHAGITIS: ICD-10-CM

## 2018-11-29 DIAGNOSIS — E55.9 VITAMIN D DEFICIENCY: ICD-10-CM

## 2018-11-29 PROCEDURE — 99214 OFFICE O/P EST MOD 30 MIN: CPT | Performed by: FAMILY MEDICINE

## 2018-11-29 RX ORDER — OMEPRAZOLE 20 MG/1
20 CAPSULE, DELAYED RELEASE ORAL AS NEEDED
Qty: 90 CAPSULE | Refills: 1 | Status: SHIPPED | OUTPATIENT
Start: 2018-11-29 | End: 2020-09-17

## 2018-11-29 RX ORDER — ATORVASTATIN CALCIUM 20 MG/1
TABLET, FILM COATED ORAL
Qty: 90 TABLET | Refills: 1 | Status: SHIPPED | OUTPATIENT
Start: 2018-11-29 | End: 2019-07-05

## 2018-11-29 NOTE — PROGRESS NOTES
[facility] [place]  PROGRESS NOTE        HPI:   This is a 37year old female coming in for Patient presents with:  Medication Follow-Up  Other: right arm pain persists sometimes    HPI  She notes that she just has some issues with her right arm. healthy       Number of Children: 3      Lutheran/ Sikhism: Voodoo      Other Social History Comments:     Social History    Tobacco Use      Smoking status: Never Smoker      Smokeless tobacco: Never Used    Alcohol use: No      Alcohol/week: 0.0 oz Location: Left arm, Patient Position: Sitting, Cuff Size: adult)   Pulse 66   Temp 98.2 °F (36.8 °C) (Temporal)   Resp 16   Ht 64.5\"   Wt 162 lb   LMP 11/03/2018   BMI 27.38 kg/m²  Estimated body mass index is 27.38 kg/m² as calculated from the following: Assessment & Plan notes found for this encounter. future appointment:    No Follow-up on file.     Meds & Refills for this Visit:  Requested Prescriptions      No prescriptions requested or ordered in this encounter       Outcome: Parent verbalizes un

## 2018-11-30 RX ORDER — ERGOCALCIFEROL 1.25 MG/1
CAPSULE ORAL
Qty: 12 CAPSULE | Refills: 0 | OUTPATIENT
Start: 2018-11-30

## 2018-12-15 ENCOUNTER — LABORATORY ENCOUNTER (OUTPATIENT)
Dept: LAB | Age: 43
End: 2018-12-15
Attending: FAMILY MEDICINE
Payer: COMMERCIAL

## 2018-12-15 DIAGNOSIS — E78.49 FAMILIAL MULTIPLE LIPOPROTEIN-TYPE HYPERLIPIDEMIA: ICD-10-CM

## 2018-12-15 DIAGNOSIS — E55.9 VITAMIN D DEFICIENCY: ICD-10-CM

## 2018-12-15 DIAGNOSIS — E53.8 B12 DEFICIENCY: ICD-10-CM

## 2018-12-15 DIAGNOSIS — D50.9 IRON DEFICIENCY ANEMIA, UNSPECIFIED IRON DEFICIENCY ANEMIA TYPE: ICD-10-CM

## 2018-12-15 PROCEDURE — 81003 URINALYSIS AUTO W/O SCOPE: CPT | Performed by: FAMILY MEDICINE

## 2018-12-15 PROCEDURE — 83550 IRON BINDING TEST: CPT | Performed by: FAMILY MEDICINE

## 2018-12-15 PROCEDURE — 84550 ASSAY OF BLOOD/URIC ACID: CPT | Performed by: FAMILY MEDICINE

## 2018-12-15 PROCEDURE — 82607 VITAMIN B-12: CPT | Performed by: FAMILY MEDICINE

## 2018-12-15 PROCEDURE — 85025 COMPLETE CBC W/AUTO DIFF WBC: CPT | Performed by: FAMILY MEDICINE

## 2018-12-15 PROCEDURE — 82728 ASSAY OF FERRITIN: CPT | Performed by: FAMILY MEDICINE

## 2018-12-15 PROCEDURE — 82306 VITAMIN D 25 HYDROXY: CPT | Performed by: FAMILY MEDICINE

## 2018-12-15 PROCEDURE — 80061 LIPID PANEL: CPT | Performed by: FAMILY MEDICINE

## 2018-12-15 PROCEDURE — 80053 COMPREHEN METABOLIC PANEL: CPT | Performed by: FAMILY MEDICINE

## 2018-12-15 PROCEDURE — 36415 COLL VENOUS BLD VENIPUNCTURE: CPT | Performed by: FAMILY MEDICINE

## 2018-12-15 PROCEDURE — 82550 ASSAY OF CK (CPK): CPT | Performed by: FAMILY MEDICINE

## 2018-12-15 PROCEDURE — 83540 ASSAY OF IRON: CPT | Performed by: FAMILY MEDICINE

## 2018-12-17 ENCOUNTER — TELEPHONE (OUTPATIENT)
Dept: FAMILY MEDICINE CLINIC | Facility: CLINIC | Age: 43
End: 2018-12-17

## 2018-12-17 DIAGNOSIS — E55.9 VITAMIN D DEFICIENCY: Primary | ICD-10-CM

## 2018-12-17 DIAGNOSIS — D50.9 IRON DEFICIENCY ANEMIA, UNSPECIFIED IRON DEFICIENCY ANEMIA TYPE: ICD-10-CM

## 2018-12-17 NOTE — TELEPHONE ENCOUNTER
----- Message from Curt Strickland MD sent at 12/17/2018  8:37 AM CST -----  Vitamin D is now doing well. Recommend 2000 units of vitamin D daily, increase if already taking. Goal of 51 for vitamin D   Recheck vitamin D level in 6  months.

## 2018-12-17 NOTE — TELEPHONE ENCOUNTER
----- Message from Marquis Drake MD sent at 12/17/2018  8:36 AM CST -----  Recommend iron supplementation. Increase to bid. Consider change supplier. Take iron with acidic juice. follow up iron profile, saturations and ferritin fasting in 3 months.

## 2018-12-17 NOTE — TELEPHONE ENCOUNTER
----- Message from Mir Lovelace MD sent at 12/17/2018  8:37 AM CST -----  Vitamin B12 is still high, but not as high as previously. No further supplementation at this time. Recheck in 1 year.

## 2018-12-17 NOTE — TELEPHONE ENCOUNTER
Patient notified of results and recommendations and expressed understanding. Orders in for recheck of bloodwork  Patient given information for [employer]. net    Laly Drain, 12/17/18, 2:08 PM

## 2019-07-05 DIAGNOSIS — E78.49 FAMILIAL MULTIPLE LIPOPROTEIN-TYPE HYPERLIPIDEMIA: Primary | ICD-10-CM

## 2019-07-06 NOTE — TELEPHONE ENCOUNTER
Refill request from [address] [place] [address] for atorvastatin. Last office visit followup with Dr. Karen Fontaine on 11/29/18. Pateint was to have follow up labs in March for iron studdies and vitamin D level. No appts scheduled.    Future appt:    Last Appointment:

## 2019-07-06 NOTE — TELEPHONE ENCOUNTER
Patient returned call. She scheduled medication follow up. States she can wait for Dr. Carnella Bernheim return on Monday for refill.

## 2019-07-08 RX ORDER — ATORVASTATIN CALCIUM 20 MG/1
TABLET, FILM COATED ORAL
Qty: 90 TABLET | Refills: 1 | Status: SHIPPED | OUTPATIENT
Start: 2019-07-08 | End: 2019-09-16

## 2019-08-30 ENCOUNTER — TELEPHONE (OUTPATIENT)
Dept: FAMILY MEDICINE CLINIC | Facility: CLINIC | Age: 44
End: 2019-08-30

## 2019-08-30 NOTE — TELEPHONE ENCOUNTER
Pt contacted in regards to below question, pt informed that she could have labs drawn prior to appt.    Lab appt schedule for 9/7/19 @ 10am

## 2019-08-30 NOTE — TELEPHONE ENCOUNTER
has orders for labs that were put in back in december- pt wants to know if she needs to get these done before her upcoming appt on 9/16

## 2019-09-07 ENCOUNTER — APPOINTMENT (OUTPATIENT)
Dept: LAB | Age: 44
End: 2019-09-07
Attending: FAMILY MEDICINE
Payer: COMMERCIAL

## 2019-09-07 DIAGNOSIS — D50.9 IRON DEFICIENCY ANEMIA, UNSPECIFIED IRON DEFICIENCY ANEMIA TYPE: ICD-10-CM

## 2019-09-07 DIAGNOSIS — E55.9 VITAMIN D DEFICIENCY: ICD-10-CM

## 2019-09-07 DIAGNOSIS — E53.8 B12 DEFICIENCY: ICD-10-CM

## 2019-09-07 LAB
DEPRECATED HBV CORE AB SER IA-ACNC: 36.9 NG/ML (ref 12–240)
IRON SATURATION: 17 % (ref 15–50)
IRON SERPL-MCNC: 57 UG/DL (ref 50–170)
TOTAL IRON BINDING CAPACITY: 326 UG/DL (ref 240–450)
TRANSFERRIN SERPL-MCNC: 219 MG/DL (ref 200–360)
VIT B12 SERPL-MCNC: 747 PG/ML (ref 193–986)
VIT D+METAB SERPL-MCNC: 56.2 NG/ML (ref 30–100)

## 2019-09-07 PROCEDURE — 83540 ASSAY OF IRON: CPT | Performed by: FAMILY MEDICINE

## 2019-09-07 PROCEDURE — 82607 VITAMIN B-12: CPT | Performed by: FAMILY MEDICINE

## 2019-09-07 PROCEDURE — 83550 IRON BINDING TEST: CPT | Performed by: FAMILY MEDICINE

## 2019-09-07 PROCEDURE — 36415 COLL VENOUS BLD VENIPUNCTURE: CPT | Performed by: FAMILY MEDICINE

## 2019-09-07 PROCEDURE — 82728 ASSAY OF FERRITIN: CPT | Performed by: FAMILY MEDICINE

## 2019-09-07 PROCEDURE — 82306 VITAMIN D 25 HYDROXY: CPT | Performed by: FAMILY MEDICINE

## 2019-09-09 ENCOUNTER — TELEPHONE (OUTPATIENT)
Dept: FAMILY MEDICINE CLINIC | Facility: CLINIC | Age: 44
End: 2019-09-09

## 2019-09-09 NOTE — TELEPHONE ENCOUNTER
----- Message from Corin Dixon MD sent at 9/9/2019  7:35 AM CDT -----  Iron stores are low. [employer]. net  (0528 access number) for vitamins  Iron plus c bid x 3  Months and recheck iron.    This may be contributing to RLS and PLMD.   Goal to treat

## 2019-09-16 ENCOUNTER — OFFICE VISIT (OUTPATIENT)
Dept: FAMILY MEDICINE CLINIC | Facility: CLINIC | Age: 44
End: 2019-09-16
Payer: COMMERCIAL

## 2019-09-16 VITALS
WEIGHT: 162.38 LBS | TEMPERATURE: 99 F | HEART RATE: 78 BPM | BODY MASS INDEX: 27.38 KG/M2 | RESPIRATION RATE: 16 BRPM | HEIGHT: 64.5 IN | DIASTOLIC BLOOD PRESSURE: 80 MMHG | SYSTOLIC BLOOD PRESSURE: 124 MMHG

## 2019-09-16 DIAGNOSIS — K90.0 CELIAC DISEASE: ICD-10-CM

## 2019-09-16 DIAGNOSIS — K21.00 GASTROESOPHAGEAL REFLUX DISEASE WITH ESOPHAGITIS: ICD-10-CM

## 2019-09-16 DIAGNOSIS — E78.49 FAMILIAL MULTIPLE LIPOPROTEIN-TYPE HYPERLIPIDEMIA: Primary | ICD-10-CM

## 2019-09-16 DIAGNOSIS — D50.9 IRON DEFICIENCY ANEMIA, UNSPECIFIED IRON DEFICIENCY ANEMIA TYPE: ICD-10-CM

## 2019-09-16 DIAGNOSIS — E53.8 B12 DEFICIENCY: ICD-10-CM

## 2019-09-16 DIAGNOSIS — E55.9 VITAMIN D DEFICIENCY: ICD-10-CM

## 2019-09-16 PROCEDURE — 90471 IMMUNIZATION ADMIN: CPT | Performed by: FAMILY MEDICINE

## 2019-09-16 PROCEDURE — 99214 OFFICE O/P EST MOD 30 MIN: CPT | Performed by: FAMILY MEDICINE

## 2019-09-16 PROCEDURE — 90686 IIV4 VACC NO PRSV 0.5 ML IM: CPT | Performed by: FAMILY MEDICINE

## 2019-09-16 RX ORDER — GARLIC EXTRACT 500 MG
1 CAPSULE ORAL 2 TIMES DAILY
COMMUNITY

## 2019-09-16 RX ORDER — ATORVASTATIN CALCIUM 20 MG/1
20 TABLET, FILM COATED ORAL DAILY
Qty: 90 TABLET | Refills: 1 | Status: SHIPPED | OUTPATIENT
Start: 2019-09-16 | End: 2020-02-27

## 2019-09-16 NOTE — PROGRESS NOTES
[facility] [place]  PROGRESS NOTE        HPI:   This is a 37year old female coming in for Patient presents with:  Medication Follow-Up    HPI  Her stomach is doing well right now,  Her weight hasn't changed much but she had got up to 170 pounds Smokeless tobacco: Never Used    Alcohol use: No      Alcohol/week: 0.0 standard drinks    Drug use: No    Family History:  Family History   Problem Relation Age of Onset   • Cancer Father         anal cancer,  aged 71   • Dementia Mother         N LMP 08/31/2019 (Within Days)   BMI 27.45 kg/m²  Estimated body mass index is 27.45 kg/m² as calculated from the following:    Height as of this encounter: 64.5\". Weight as of this encounter: 162 lb 6.4 oz.    Wt Readings from Last 6 Encounters:  09/16/1 type   Add spinach to diet,    Continue present managment    Recheck in 6 months. Vitamin D deficiency   Doing well. B12 deficiency   Doing well.     eczematide plaque. Recommend trial of triamcinalone x 6 weeks and see if resolves.           No

## 2019-10-02 ENCOUNTER — OFFICE VISIT (OUTPATIENT)
Dept: FAMILY MEDICINE CLINIC | Facility: CLINIC | Age: 44
End: 2019-10-02
Payer: COMMERCIAL

## 2019-10-02 VITALS
SYSTOLIC BLOOD PRESSURE: 120 MMHG | BODY MASS INDEX: 27.76 KG/M2 | WEIGHT: 164.63 LBS | DIASTOLIC BLOOD PRESSURE: 80 MMHG | OXYGEN SATURATION: 97 % | HEIGHT: 64.5 IN | RESPIRATION RATE: 16 BRPM | TEMPERATURE: 99 F | HEART RATE: 80 BPM

## 2019-10-02 DIAGNOSIS — J03.90 TONSILLITIS: Primary | ICD-10-CM

## 2019-10-02 DIAGNOSIS — J02.9 SORE THROAT: ICD-10-CM

## 2019-10-02 DIAGNOSIS — J00 ACUTE NASOPHARYNGITIS: ICD-10-CM

## 2019-10-02 PROCEDURE — 99213 OFFICE O/P EST LOW 20 MIN: CPT | Performed by: NURSE PRACTITIONER

## 2019-10-02 PROCEDURE — 87880 STREP A ASSAY W/OPTIC: CPT | Performed by: NURSE PRACTITIONER

## 2019-10-02 PROCEDURE — 87081 CULTURE SCREEN ONLY: CPT | Performed by: NURSE PRACTITIONER

## 2019-10-02 RX ORDER — CEPHALEXIN 500 MG/1
500 CAPSULE ORAL 3 TIMES DAILY
Qty: 30 CAPSULE | Refills: 0 | Status: SHIPPED | OUTPATIENT
Start: 2019-10-02 | End: 2019-10-12

## 2019-10-02 NOTE — PATIENT INSTRUCTIONS
Rest, increase fluids, salt water gargles ,reilly pot (use distilled water) or saline nasal spray, Advil cold and sinus (behind the counter), Tylenol/Ibuprofen, follow up if symptoms persist or increase.

## 2019-10-02 NOTE — PROGRESS NOTES
CHIEF COMPLAINT:   Patient presents with:  Sore Throat  Nasal Congestion      HPI:   Galo Morrell is a 37year old female who presents to clinic today with complaints of feeling ill for the last 4-5 days-   Sore throat - now post nasal drip, some cough- Sitting, Cuff Size: adult)   Pulse 80   Temp 98.5 °F (36.9 °C) (Tympanic)   Resp 16   Ht 64.5\"   Wt 164 lb 9.6 oz (74.7 kg)   SpO2 97%   BMI 27.82 kg/m²   GENERAL: well developed, well nourished,in no apparent distress  HEAD:  no tenderness on palpation o

## 2019-10-04 ENCOUNTER — TELEPHONE (OUTPATIENT)
Dept: FAMILY MEDICINE CLINIC | Facility: CLINIC | Age: 44
End: 2019-10-04

## 2019-10-04 NOTE — TELEPHONE ENCOUNTER
----- Message from NELDA Mayo sent at 10/4/2019  9:02 AM CDT -----  Negative strep culture- please notify patient

## 2020-02-27 DIAGNOSIS — E78.49 FAMILIAL MULTIPLE LIPOPROTEIN-TYPE HYPERLIPIDEMIA: ICD-10-CM

## 2020-02-27 RX ORDER — ATORVASTATIN CALCIUM 20 MG/1
20 TABLET, FILM COATED ORAL DAILY
Qty: 90 TABLET | Refills: 1 | Status: SHIPPED | OUTPATIENT
Start: 2020-02-27 | End: 2020-07-28

## 2020-02-27 NOTE — TELEPHONE ENCOUNTER
Atorvastatin LR - 9/16/19, 90 tab, 1 refill    LOV - 9/16/19 Med FU  NOV - 3/16/20 6 Month Med FU    Future appt:     Your appointments     Date & Time Appointment Department [facility])    Mar 16, 2020  2:20 PM CDT Follow Up Visit with MD Lit Rosen

## 2020-03-16 ENCOUNTER — OFFICE VISIT (OUTPATIENT)
Dept: FAMILY MEDICINE CLINIC | Facility: CLINIC | Age: 45
End: 2020-03-16
Payer: COMMERCIAL

## 2020-03-16 VITALS
SYSTOLIC BLOOD PRESSURE: 118 MMHG | DIASTOLIC BLOOD PRESSURE: 78 MMHG | BODY MASS INDEX: 28.67 KG/M2 | RESPIRATION RATE: 18 BRPM | OXYGEN SATURATION: 99 % | TEMPERATURE: 98 F | WEIGHT: 170 LBS | HEART RATE: 76 BPM | HEIGHT: 64.5 IN

## 2020-03-16 DIAGNOSIS — K22.70 BARRETT'S ESOPHAGUS WITHOUT DYSPLASIA: ICD-10-CM

## 2020-03-16 DIAGNOSIS — K21.00 GASTROESOPHAGEAL REFLUX DISEASE WITH ESOPHAGITIS: Primary | ICD-10-CM

## 2020-03-16 DIAGNOSIS — E53.8 B12 DEFICIENCY: ICD-10-CM

## 2020-03-16 DIAGNOSIS — D50.9 IRON DEFICIENCY ANEMIA, UNSPECIFIED IRON DEFICIENCY ANEMIA TYPE: ICD-10-CM

## 2020-03-16 DIAGNOSIS — E78.49 FAMILIAL MULTIPLE LIPOPROTEIN-TYPE HYPERLIPIDEMIA: ICD-10-CM

## 2020-03-16 DIAGNOSIS — Z12.31 VISIT FOR SCREENING MAMMOGRAM: ICD-10-CM

## 2020-03-16 DIAGNOSIS — E55.9 VITAMIN D DEFICIENCY: ICD-10-CM

## 2020-03-16 DIAGNOSIS — E04.2 MULTIPLE THYROID NODULES: ICD-10-CM

## 2020-03-16 PROBLEM — N20.1 URETERAL CALCULI: Status: ACTIVE | Noted: 2017-12-15

## 2020-03-16 PROCEDURE — 99214 OFFICE O/P EST MOD 30 MIN: CPT | Performed by: FAMILY MEDICINE

## 2020-03-16 RX ORDER — BUPROPION HYDROCHLORIDE 150 MG/1
150 TABLET ORAL DAILY
Qty: 30 TABLET | Refills: 3 | Status: SHIPPED | OUTPATIENT
Start: 2020-03-16 | End: 2020-06-04

## 2020-03-16 NOTE — PROGRESS NOTES
[facility] [place]  PROGRESS NOTE        HPI:   This is a 40year old female coming in for Patient presents with:   Follow - Up: medication check    HPI   She notes that she is taking all her supplements through vital nutrients and taking them tw drinks    Drug use: No    Family History:  Family History   Problem Relation Age of Onset   • Cancer Father         anal cancer,  aged 71   • Dementia Mother         Nursing home 79     Allergies:  No Known Allergies  Current Meds:  Current Outpati Psychiatric/Behavioral: Positive for dysphoric mood. All other systems reviewed and are negative.       EXAM:   /78   Pulse 76   Temp 97.6 °F (36.4 °C)   Resp 18   Ht 64.5\"   Wt 170 lb (77.1 kg)   SpO2 99%   BMI 28.73 kg/m²  Estimated body mass i years     Familial multiple lipoprotein-type hyperlipidemia  -     IRON AND TIBC; Future  -     FERRITIN; Future  -     VITAMIN D, 25-HYDROXY; Future  -     CK CREATINE KINASE (NOT CREATININE); Future  -     COMP METABOLIC PANEL (14);  Future  -     LIPID P was created utilizing Dragon speech recognition software. Please excuse any grammatical errors. Call my office if you have any questions regarding this note.  \"

## 2020-03-19 ENCOUNTER — TELEPHONE (OUTPATIENT)
Dept: FAMILY MEDICINE CLINIC | Facility: CLINIC | Age: 45
End: 2020-03-19

## 2020-03-19 ENCOUNTER — HOSPITAL ENCOUNTER (OUTPATIENT)
Dept: ULTRASOUND IMAGING | Age: 45
Discharge: HOME | End: 2020-03-19
Attending: FAMILY MEDICINE
Payer: COMMERCIAL

## 2020-03-19 DIAGNOSIS — E04.2 MULTIPLE THYROID NODULES: Primary | ICD-10-CM

## 2020-03-19 DIAGNOSIS — E04.2 MULTIPLE THYROID NODULES: ICD-10-CM

## 2020-03-19 PROCEDURE — 76536 US EXAM OF HEAD AND NECK: CPT | Performed by: FAMILY MEDICINE

## 2020-03-19 NOTE — TELEPHONE ENCOUNTER
----- Message from Maynor Stokes MD sent at 3/19/2020 12:36 PM CDT -----  Pt with increasing thyroid nodules. Recommend add tsh, t4, and t3 to labs next week. Recheck US in 1 year.

## 2020-04-06 ENCOUNTER — TELEPHONE (OUTPATIENT)
Dept: FAMILY MEDICINE CLINIC | Facility: CLINIC | Age: 45
End: 2020-04-06

## 2020-04-06 NOTE — TELEPHONE ENCOUNTER
Patient was due to have labs drawn 2 weeks from 3/19/20 due to increasing thyroid nodules. Tsh,T4,T3. Patient would like to know if she needs these labs drawn soon or are they able to wait until the end of the month.  Patient would prefer labs drawn now,

## 2020-04-06 NOTE — TELEPHONE ENCOUNTER
Spoke with patient regarding the below recommendations. Will call back at the end of April for labs. No further questions at this time.

## 2020-06-03 ENCOUNTER — APPOINTMENT (OUTPATIENT)
Dept: LAB | Age: 45
End: 2020-06-03
Attending: FAMILY MEDICINE
Payer: COMMERCIAL

## 2020-06-03 DIAGNOSIS — E55.9 VITAMIN D DEFICIENCY: ICD-10-CM

## 2020-06-03 DIAGNOSIS — E04.2 MULTIPLE THYROID NODULES: ICD-10-CM

## 2020-06-03 DIAGNOSIS — E78.49 FAMILIAL MULTIPLE LIPOPROTEIN-TYPE HYPERLIPIDEMIA: ICD-10-CM

## 2020-06-03 DIAGNOSIS — D50.9 IRON DEFICIENCY ANEMIA, UNSPECIFIED IRON DEFICIENCY ANEMIA TYPE: ICD-10-CM

## 2020-06-03 PROCEDURE — 36415 COLL VENOUS BLD VENIPUNCTURE: CPT | Performed by: FAMILY MEDICINE

## 2020-06-03 PROCEDURE — 82306 VITAMIN D 25 HYDROXY: CPT | Performed by: FAMILY MEDICINE

## 2020-06-03 PROCEDURE — 84439 ASSAY OF FREE THYROXINE: CPT | Performed by: FAMILY MEDICINE

## 2020-06-03 PROCEDURE — 82728 ASSAY OF FERRITIN: CPT | Performed by: FAMILY MEDICINE

## 2020-06-03 PROCEDURE — 83540 ASSAY OF IRON: CPT | Performed by: FAMILY MEDICINE

## 2020-06-03 PROCEDURE — 80061 LIPID PANEL: CPT | Performed by: FAMILY MEDICINE

## 2020-06-03 PROCEDURE — 84481 FREE ASSAY (FT-3): CPT | Performed by: FAMILY MEDICINE

## 2020-06-03 PROCEDURE — 84443 ASSAY THYROID STIM HORMONE: CPT | Performed by: FAMILY MEDICINE

## 2020-06-03 PROCEDURE — 82550 ASSAY OF CK (CPK): CPT | Performed by: FAMILY MEDICINE

## 2020-06-03 PROCEDURE — 80053 COMPREHEN METABOLIC PANEL: CPT | Performed by: FAMILY MEDICINE

## 2020-06-03 PROCEDURE — 83550 IRON BINDING TEST: CPT | Performed by: FAMILY MEDICINE

## 2020-06-04 ENCOUNTER — TELEMEDICINE (OUTPATIENT)
Dept: FAMILY MEDICINE CLINIC | Facility: CLINIC | Age: 45
End: 2020-06-04

## 2020-06-04 ENCOUNTER — TELEPHONE (OUTPATIENT)
Dept: FAMILY MEDICINE CLINIC | Facility: CLINIC | Age: 45
End: 2020-06-04

## 2020-06-04 VITALS — BODY MASS INDEX: 28 KG/M2 | WEIGHT: 168 LBS

## 2020-06-04 DIAGNOSIS — E78.49 FAMILIAL MULTIPLE LIPOPROTEIN-TYPE HYPERLIPIDEMIA: Primary | ICD-10-CM

## 2020-06-04 DIAGNOSIS — F32.89 OTHER DEPRESSION: ICD-10-CM

## 2020-06-04 PROCEDURE — 99214 OFFICE O/P EST MOD 30 MIN: CPT | Performed by: FAMILY MEDICINE

## 2020-06-04 RX ORDER — BUPROPION HYDROCHLORIDE 300 MG/1
300 TABLET ORAL DAILY
Qty: 30 TABLET | Refills: 3 | Status: SHIPPED | OUTPATIENT
Start: 2020-06-04 | End: 2020-09-17

## 2020-06-04 NOTE — TELEPHONE ENCOUNTER
Spoke with patient. Scheduled appt.   Future Appointments   Date Time Provider Rachel Leo   6/4/2020  4:00 PM Christiano Hayes MD [facility] [place] [facility] [place]   9/17/2020  9:50 AM Christiano Hayes MD [facility] [place] [facility] [place]

## 2020-06-04 NOTE — PROGRESS NOTES
[facility] [place]  PROGRESS NOTE        HPI:   This is a 40year old female coming in for Patient presents with: Follow - Up: Review medication    HPI she notes that her emotions get the best of her at a normal basis,  She cannot lose weight. mg/dL    VLDL 29 0 - 30 mg/dL    Non HDL Chol 127 <130 mg/dL    FASTING Yes    FREE T4 (FREE THYROXINE)   Result Value Ref Range    Free T4 1.0 0.8 - 1.7 ng/dL   FREE T3 (TRIIODOTHYRONINE)   Result Value Ref Range    T3 Free 2.81 2.40 - 4.20 pg/mL   ASSAY, triamcinolone acetonide 0.1 % External Ointment Apply 1 Application topically 2 (two) times daily. 30 g 0   • omeprazole 20 MG Oral Capsule Delayed Release Take 1 capsule (20 mg total) by mouth as needed.  90 capsule 1   • Cholecalciferol (VITAMIN D) 1000 u Pulmonary/Chest: Effort normal.   Neurological: She is alert and oriented to person, place, and time. Skin:   No noted rashes. Psychiatric: She has a normal mood and affect.  Her behavior is normal. Judgment and thought content normal.       ASSESSM [id number]  [phone]        No follow-ups on file.     Meds & Refills for this Visit:  Requested Prescriptions     Signed Prescriptions Disp Refills   • buPROPion HCl ER, XL, 300 MG Oral Tablet 24 Hr 30 tablet 3     Sig: Take 1 tablet (300 mg total) by

## 2020-06-04 NOTE — TELEPHONE ENCOUNTER
----- Message from Brendon Cobb MD sent at 6/4/2020  8:26 AM CDT -----  Looking better continue iron supplementation. Labs look stellar! Dorita Spar

## 2020-06-04 NOTE — TELEPHONE ENCOUNTER
Spoke to patient concerning labs. Patient feels like emotions are all over the place and gaining weight again and feels like bupropion was helping at first but possibly needs the dose increased?  Patient wants to figure out whats going on and would consent

## 2020-06-04 NOTE — PATIENT INSTRUCTIONS
.It is important to get enough sleep (at least 7 hrs a night).   Increase EXERCISE to 30 -40 minutes daily with sweating, eat a healthy diet (5 fruits and/or vegetables a day) decrease carbohydrates, stay hydrated,  take a multivitamin, take fish/krill oil

## 2020-06-25 PROBLEM — N88.8 CYST, CERVIX: Status: ACTIVE | Noted: 2017-02-06

## 2020-06-26 ENCOUNTER — TELEMEDICINE (OUTPATIENT)
Dept: INTERNAL MEDICINE CLINIC | Facility: CLINIC | Age: 45
End: 2020-06-26

## 2020-06-26 VITALS — HEIGHT: 62 IN | BODY MASS INDEX: 30.55 KG/M2 | WEIGHT: 166 LBS

## 2020-06-26 DIAGNOSIS — E04.2 MULTIPLE THYROID NODULES: ICD-10-CM

## 2020-06-26 DIAGNOSIS — Z83.3 FAMILY HISTORY OF DIABETES MELLITUS IN MATERNAL GRANDFATHER: ICD-10-CM

## 2020-06-26 DIAGNOSIS — E78.49 FAMILIAL MULTIPLE LIPOPROTEIN-TYPE HYPERLIPIDEMIA: ICD-10-CM

## 2020-06-26 DIAGNOSIS — E53.8 B12 DEFICIENCY: ICD-10-CM

## 2020-06-26 DIAGNOSIS — Z51.81 THERAPEUTIC DRUG MONITORING: Primary | ICD-10-CM

## 2020-06-26 DIAGNOSIS — F32.A ANXIETY AND DEPRESSION: ICD-10-CM

## 2020-06-26 DIAGNOSIS — F41.9 ANXIETY AND DEPRESSION: ICD-10-CM

## 2020-06-26 DIAGNOSIS — E55.9 VITAMIN D DEFICIENCY: ICD-10-CM

## 2020-06-26 PROCEDURE — 99214 OFFICE O/P EST MOD 30 MIN: CPT | Performed by: NURSE PRACTITIONER

## 2020-06-26 RX ORDER — PHENTERMINE HYDROCHLORIDE 15 MG/1
15 CAPSULE ORAL EVERY MORNING
Qty: 30 CAPSULE | Refills: 0 | Status: SHIPPED | OUTPATIENT
Start: 2020-06-26 | End: 2020-07-24

## 2020-06-26 NOTE — PROGRESS NOTES
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 , Disp: , Rfl:     No current facility-administered medications on file prior to visit.        ASSESSMENT/PLAN  (Z51.81) Therapeutic drug monitoring  (primary encounter diagnosis)    (I65 the best interest of the provider-patient relationship, due to the ongoing public health crisis/national emergency and because of restrictions of visitation. There are limitations of this visit as no physical exam could be performed.   Every conscious effo

## 2020-06-26 NOTE — PATIENT INSTRUCTIONS
We are here to support you with weight loss, but please remember that you still need your primary care provider for your routine health maintenance.       PLAN:  Will start phentermine 15mg (daily)   Go to the lab for blood work   Follow up with me in 1 mon Activity level: not very active (can't count exercise towards calorie number per day)                   ** Daily INPUT> Look at nutrition section-- \"nutrients\" and it will break down your macros for the day (ie.  Protein, carbs, fibers, suga

## 2020-07-21 ENCOUNTER — LABORATORY ENCOUNTER (OUTPATIENT)
Dept: LAB | Age: 45
End: 2020-07-21
Attending: FAMILY MEDICINE
Payer: COMMERCIAL

## 2020-07-21 DIAGNOSIS — Z83.3 FAMILY HISTORY OF DIABETES MELLITUS IN MATERNAL GRANDFATHER: ICD-10-CM

## 2020-07-21 DIAGNOSIS — Z51.81 THERAPEUTIC DRUG MONITORING: ICD-10-CM

## 2020-07-21 LAB
EST. AVERAGE GLUCOSE BLD GHB EST-MCNC: 103 MG/DL (ref 68–126)
HBA1C MFR BLD HPLC: 5.2 % (ref ?–5.7)

## 2020-07-21 PROCEDURE — 36415 COLL VENOUS BLD VENIPUNCTURE: CPT | Performed by: NURSE PRACTITIONER

## 2020-07-21 PROCEDURE — 83036 HEMOGLOBIN GLYCOSYLATED A1C: CPT | Performed by: NURSE PRACTITIONER

## 2020-07-24 ENCOUNTER — VIRTUAL PHONE E/M (OUTPATIENT)
Dept: INTERNAL MEDICINE CLINIC | Facility: CLINIC | Age: 45
End: 2020-07-24

## 2020-07-24 VITALS — WEIGHT: 164 LBS | BODY MASS INDEX: 30 KG/M2

## 2020-07-24 DIAGNOSIS — F32.A ANXIETY AND DEPRESSION: ICD-10-CM

## 2020-07-24 DIAGNOSIS — E53.8 B12 DEFICIENCY: ICD-10-CM

## 2020-07-24 DIAGNOSIS — F41.9 ANXIETY AND DEPRESSION: ICD-10-CM

## 2020-07-24 DIAGNOSIS — Z51.81 THERAPEUTIC DRUG MONITORING: Primary | ICD-10-CM

## 2020-07-24 DIAGNOSIS — E55.9 VITAMIN D DEFICIENCY: ICD-10-CM

## 2020-07-24 PROCEDURE — 99213 OFFICE O/P EST LOW 20 MIN: CPT | Performed by: NURSE PRACTITIONER

## 2020-07-24 RX ORDER — PHENTERMINE HYDROCHLORIDE 37.5 MG/1
37.5 TABLET ORAL
Qty: 30 TABLET | Refills: 0 | Status: SHIPPED | OUTPATIENT
Start: 2020-07-24 | End: 2020-08-31

## 2020-07-24 NOTE — PROGRESS NOTES
Virtual Telephone Check-In    Joe Stricklandtzer verbally consents to a Virtual/Telephone Check-In visit on 7/24/2020. Patient understands and accepts financial responsibility for any deductible, co-insurance and/or co-pays associated with this service.     D changes  Psych: denies any mood changes     Physical Exam:  Appropriate affect and mood, normal logic and thought process   Patient speaking in full sentences, no increased work of breathing    Assessment/Plan:   Diagnoses and all orders for this visit: APRN

## 2020-07-24 NOTE — PATIENT INSTRUCTIONS
We are here to support you with weight loss, but please remember that you still need your primary care provider for your routine health maintenance.       PLAN:  Will start taking phentermine 37.5mg   Follow up with me in 1 month  Schedule follow up appoint ** Daily INPUT> Look at nutrition section-- \"nutrients\" and it will break down your macros for the day (ie. Protein, carbs, fibers, sugars and fats). Try to stay within these numbers daily     2.  \"7 minute workout\" to help with exercise/a

## 2020-07-28 ENCOUNTER — TELEMEDICINE (OUTPATIENT)
Dept: FAMILY MEDICINE CLINIC | Facility: CLINIC | Age: 45
End: 2020-07-28
Payer: COMMERCIAL

## 2020-07-28 VITALS — BODY MASS INDEX: 30 KG/M2 | WEIGHT: 164 LBS

## 2020-07-28 DIAGNOSIS — E78.49 FAMILIAL MULTIPLE LIPOPROTEIN-TYPE HYPERLIPIDEMIA: ICD-10-CM

## 2020-07-28 DIAGNOSIS — F32.89 OTHER DEPRESSION: ICD-10-CM

## 2020-07-28 PROCEDURE — 99214 OFFICE O/P EST MOD 30 MIN: CPT | Performed by: FAMILY MEDICINE

## 2020-07-28 RX ORDER — ATORVASTATIN CALCIUM 20 MG/1
20 TABLET, FILM COATED ORAL DAILY
Qty: 90 TABLET | Refills: 1 | Status: SHIPPED | OUTPATIENT
Start: 2020-07-28 | End: 2021-02-08

## 2020-07-28 NOTE — PROGRESS NOTES
[facility] [place]  PROGRESS NOTE        HPI:   This is a 40year old female coming in for Patient presents with: Follow - Up: depression/ anxiety     HPI Things are going better and she feels a bit more even keeled.   Ad was tracking g her cycl No      Alcohol/week: 0.0 standard drinks    Drug use: No    Family History:  Family History   Problem Relation Age of Onset   • Cancer Father         anal cancer,  aged 71   • Dementia Mother         Nursing home 79     Allergies:  No Known Allerg systems reviewed and are negative. EXAM:   Wt 164 lb (74.4 kg)   LMP 06/23/2020   BMI 30.00 kg/m²  Estimated body mass index is 30 kg/m² as calculated from the following:    Height as of 6/26/20: 62\". Weight as of this encounter: 164 lb (74.4 kg). co-insurance and/or co-pays associated with this service. Duration of the service: 17+ min    Summary of topics discussed:  *as above      No problem-specific Assessment & Plan notes found for this encounter.        future appointment:    Your appointmen

## 2020-08-31 RX ORDER — PHENTERMINE HYDROCHLORIDE 37.5 MG/1
37.5 TABLET ORAL
Qty: 30 TABLET | Refills: 0 | Status: SHIPPED | OUTPATIENT
Start: 2020-08-31 | End: 2021-01-12

## 2020-09-17 ENCOUNTER — OFFICE VISIT (OUTPATIENT)
Dept: FAMILY MEDICINE CLINIC | Facility: CLINIC | Age: 45
End: 2020-09-17
Payer: COMMERCIAL

## 2020-09-17 VITALS
OXYGEN SATURATION: 98 % | RESPIRATION RATE: 16 BRPM | TEMPERATURE: 98 F | WEIGHT: 159.38 LBS | DIASTOLIC BLOOD PRESSURE: 86 MMHG | HEART RATE: 76 BPM | HEIGHT: 64.5 IN | BODY MASS INDEX: 26.88 KG/M2 | SYSTOLIC BLOOD PRESSURE: 130 MMHG

## 2020-09-17 DIAGNOSIS — E55.9 VITAMIN D DEFICIENCY: ICD-10-CM

## 2020-09-17 DIAGNOSIS — Z00.00 ENCOUNTER FOR ROUTINE ADULT HEALTH EXAMINATION WITHOUT ABNORMAL FINDINGS: ICD-10-CM

## 2020-09-17 DIAGNOSIS — F32.89 OTHER DEPRESSION: ICD-10-CM

## 2020-09-17 DIAGNOSIS — Z00.00 WELLNESS EXAMINATION: ICD-10-CM

## 2020-09-17 DIAGNOSIS — D50.9 IRON DEFICIENCY ANEMIA, UNSPECIFIED IRON DEFICIENCY ANEMIA TYPE: ICD-10-CM

## 2020-09-17 DIAGNOSIS — E78.49 FAMILIAL MULTIPLE LIPOPROTEIN-TYPE HYPERLIPIDEMIA: Primary | ICD-10-CM

## 2020-09-17 DIAGNOSIS — R06.83 SNORING: ICD-10-CM

## 2020-09-17 DIAGNOSIS — E53.8 B12 DEFICIENCY: ICD-10-CM

## 2020-09-17 PROCEDURE — 90686 IIV4 VACC NO PRSV 0.5 ML IM: CPT | Performed by: FAMILY MEDICINE

## 2020-09-17 PROCEDURE — 90471 IMMUNIZATION ADMIN: CPT | Performed by: FAMILY MEDICINE

## 2020-09-17 PROCEDURE — 3008F BODY MASS INDEX DOCD: CPT | Performed by: FAMILY MEDICINE

## 2020-09-17 PROCEDURE — 3079F DIAST BP 80-89 MM HG: CPT | Performed by: FAMILY MEDICINE

## 2020-09-17 PROCEDURE — 99396 PREV VISIT EST AGE 40-64: CPT | Performed by: FAMILY MEDICINE

## 2020-09-17 PROCEDURE — 93000 ELECTROCARDIOGRAM COMPLETE: CPT | Performed by: FAMILY MEDICINE

## 2020-09-17 PROCEDURE — 3075F SYST BP GE 130 - 139MM HG: CPT | Performed by: FAMILY MEDICINE

## 2020-09-17 RX ORDER — BUPROPION HYDROCHLORIDE 300 MG/1
300 TABLET ORAL DAILY
Qty: 90 TABLET | Refills: 3 | Status: SHIPPED | OUTPATIENT
Start: 2020-09-17 | End: 2021-10-05 | Stop reason: ALTCHOICE

## 2020-09-17 RX ORDER — PHENTERMINE HYDROCHLORIDE 37.5 MG/1
CAPSULE ORAL
COMMUNITY
Start: 2020-07-29 | End: 2020-09-17

## 2020-09-17 NOTE — PROGRESS NOTES
HPI:   Otf Jaffe is a 40year old female who presents for an Annual Health Visit. Honestly she feels pretty well. She is finding the antidepressant helpful. She feels more even keeled. And she has noted that she has had some PMDD symptoms. Date   •      • SKIN SURGERY      shave biopsy      Family History   Problem Relation Age of Onset   • Cancer Father         anal cancer,  aged 71   • Dementia Mother         Nursing home 79      SOCIAL HISTORY   Social History    Tobacco Use (77.1 kg)    Body mass index is 26.94 kg/m². Physical Exam   Constitutional: She is oriented to person, place, and time. She appears well-developed and well-nourished. HENT:   Head: Normocephalic and atraumatic.    Right Ear: External ear normal.   Lef Monitor     -     buPROPion HCl ER, XL, 300 MG Oral Tablet 24 Hr; Take 1 tablet (300 mg total) by mouth daily.     B12 deficiency  -     VITAMIN B12; Future    Vitamin D deficiency  -     VITAMIN D, 25-HYDROXY; Future    Iron deficiency anemia, unspecifi

## 2020-10-09 ENCOUNTER — SLEEP STUDY (OUTPATIENT)
Dept: FAMILY MEDICINE CLINIC | Facility: CLINIC | Age: 45
End: 2020-10-09
Payer: COMMERCIAL

## 2020-10-09 DIAGNOSIS — G47.9 SLEEP DISORDER: Primary | ICD-10-CM

## 2020-10-09 PROCEDURE — 95806 SLEEP STUDY UNATT&RESP EFFT: CPT | Performed by: FAMILY MEDICINE

## 2020-10-23 ENCOUNTER — TELEPHONE (OUTPATIENT)
Dept: FAMILY MEDICINE CLINIC | Facility: CLINIC | Age: 45
End: 2020-10-23

## 2020-10-23 DIAGNOSIS — R06.83 SNORING: Primary | ICD-10-CM

## 2020-10-23 DIAGNOSIS — G47.34 SLEEP RELATED HYPOXIA: ICD-10-CM

## 2020-10-23 DIAGNOSIS — G47.19 EXCESSIVE DAYTIME SLEEPINESS: ICD-10-CM

## 2020-10-23 NOTE — TELEPHONE ENCOUNTER
Pt notified and verbalized understanding. Pt had a HST on 10/1/20 and per Dr. Rashaun Dailey pt needs an in lab PSG in [place],    PSG scheduled. PLEASE PLACE ORDER.     Future Appointments   Date Time Provider Rachel Leo   11/12/2020  9:00 PM [facility]

## 2020-10-23 NOTE — TELEPHONE ENCOUNTER
Order entered. Flow sheet updated. HST sent to scan. You have been scheduled for a sleep study at the [address] [place] [address]. Please  Call the sleep center at  to review forms and receive forms to fill out in the mail.      [facility]

## 2020-11-17 ENCOUNTER — TELEPHONE (OUTPATIENT)
Dept: FAMILY MEDICINE CLINIC | Facility: CLINIC | Age: 45
End: 2020-11-17

## 2020-11-17 DIAGNOSIS — G47.19 EXCESSIVE DAYTIME SLEEPINESS: ICD-10-CM

## 2020-11-17 DIAGNOSIS — G47.34 HYPOXIA, SLEEP RELATED: Primary | ICD-10-CM

## 2020-11-17 DIAGNOSIS — R06.83 SNORING: ICD-10-CM

## 2020-11-17 NOTE — TELEPHONE ENCOUNTER
Patient does not have known nirali. She does have sleep related hypoxemia and a HST with an AHI of 4.9. Needs in lab psg for these reasons.    Add code g47.34 sleep hypoxemia

## 2020-11-17 NOTE — TELEPHONE ENCOUNTER
Patients order for diagnostic sleep study ordered 10/23/20. Please add DX. Either- G47.30, G47.33    Excessive daytime sleepiness (G47.19) is not able to be billed per [facility] sleep lab.

## 2020-12-20 ENCOUNTER — PATIENT MESSAGE (OUTPATIENT)
Dept: FAMILY MEDICINE CLINIC | Facility: CLINIC | Age: 45
End: 2020-12-20

## 2020-12-21 NOTE — TELEPHONE ENCOUNTER
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  I'm pretty sure we discussed me going off the antidepressant (Wellbutrin) after the last ref some type of hormone testing that we can do? Or, is there a medicine I can take? Or, can my antidepressant be upped during that week? I'm just trying to find a way to cope with all of this.  I'm seeing Brenna Schuster again and have given her permission to dis

## 2021-01-08 ENCOUNTER — LAB ENCOUNTER (OUTPATIENT)
Dept: LAB | Age: 46
End: 2021-01-08
Attending: FAMILY MEDICINE
Payer: COMMERCIAL

## 2021-01-08 DIAGNOSIS — E78.49 FAMILIAL MULTIPLE LIPOPROTEIN-TYPE HYPERLIPIDEMIA: ICD-10-CM

## 2021-01-08 DIAGNOSIS — D50.9 IRON DEFICIENCY ANEMIA, UNSPECIFIED IRON DEFICIENCY ANEMIA TYPE: ICD-10-CM

## 2021-01-08 DIAGNOSIS — E55.9 VITAMIN D DEFICIENCY: ICD-10-CM

## 2021-01-08 DIAGNOSIS — E53.8 B12 DEFICIENCY: ICD-10-CM

## 2021-01-08 LAB
ALBUMIN SERPL-MCNC: 4 G/DL (ref 3.4–5)
ALBUMIN/GLOB SERPL: 1.2 {RATIO} (ref 1–2)
ALP LIVER SERPL-CCNC: 61 U/L
ALT SERPL-CCNC: 25 U/L
ANION GAP SERPL CALC-SCNC: 2 MMOL/L (ref 0–18)
AST SERPL-CCNC: 15 U/L (ref 15–37)
BILIRUB SERPL-MCNC: 0.4 MG/DL (ref 0.1–2)
BUN BLD-MCNC: 12 MG/DL (ref 7–18)
BUN/CREAT SERPL: 14.1 (ref 10–20)
CALCIUM BLD-MCNC: 9.2 MG/DL (ref 8.5–10.1)
CHLORIDE SERPL-SCNC: 106 MMOL/L (ref 98–112)
CHOLEST SMN-MCNC: 221 MG/DL (ref ?–200)
CK SERPL-CCNC: 98 U/L
CO2 SERPL-SCNC: 29 MMOL/L (ref 21–32)
CREAT BLD-MCNC: 0.85 MG/DL
DEPRECATED HBV CORE AB SER IA-ACNC: 22.5 NG/ML
GLOBULIN PLAS-MCNC: 3.4 G/DL (ref 2.8–4.4)
GLUCOSE BLD-MCNC: 94 MG/DL (ref 70–99)
HDLC SERPL-MCNC: 63 MG/DL (ref 40–59)
IRON SATURATION: 15 %
IRON SERPL-MCNC: 63 UG/DL
LDLC SERPL CALC-MCNC: 141 MG/DL (ref ?–100)
M PROTEIN MFR SERPL ELPH: 7.4 G/DL (ref 6.4–8.2)
NONHDLC SERPL-MCNC: 158 MG/DL (ref ?–130)
OSMOLALITY SERPL CALC.SUM OF ELEC: 284 MOSM/KG (ref 275–295)
PATIENT FASTING Y/N/NP: YES
PATIENT FASTING Y/N/NP: YES
POTASSIUM SERPL-SCNC: 3.8 MMOL/L (ref 3.5–5.1)
SODIUM SERPL-SCNC: 137 MMOL/L (ref 136–145)
TOTAL IRON BINDING CAPACITY: 423 UG/DL (ref 240–450)
TRANSFERRIN SERPL-MCNC: 284 MG/DL (ref 200–360)
TRIGL SERPL-MCNC: 87 MG/DL (ref 30–149)
TSI SER-ACNC: 0.75 MIU/ML (ref 0.36–3.74)
VIT B12 SERPL-MCNC: 776 PG/ML (ref 193–986)
VIT D+METAB SERPL-MCNC: 53.9 NG/ML (ref 30–100)
VLDLC SERPL CALC-MCNC: 17 MG/DL (ref 0–30)

## 2021-01-08 PROCEDURE — 82607 VITAMIN B-12: CPT | Performed by: FAMILY MEDICINE

## 2021-01-08 PROCEDURE — 84443 ASSAY THYROID STIM HORMONE: CPT | Performed by: FAMILY MEDICINE

## 2021-01-08 PROCEDURE — 82550 ASSAY OF CK (CPK): CPT | Performed by: FAMILY MEDICINE

## 2021-01-08 PROCEDURE — 80053 COMPREHEN METABOLIC PANEL: CPT | Performed by: FAMILY MEDICINE

## 2021-01-08 PROCEDURE — 82728 ASSAY OF FERRITIN: CPT | Performed by: FAMILY MEDICINE

## 2021-01-08 PROCEDURE — 80061 LIPID PANEL: CPT | Performed by: FAMILY MEDICINE

## 2021-01-08 PROCEDURE — 82306 VITAMIN D 25 HYDROXY: CPT | Performed by: FAMILY MEDICINE

## 2021-01-08 PROCEDURE — 36415 COLL VENOUS BLD VENIPUNCTURE: CPT | Performed by: FAMILY MEDICINE

## 2021-01-08 PROCEDURE — 83540 ASSAY OF IRON: CPT | Performed by: FAMILY MEDICINE

## 2021-01-08 PROCEDURE — 83550 IRON BINDING TEST: CPT | Performed by: FAMILY MEDICINE

## 2021-01-11 ENCOUNTER — TELEPHONE (OUTPATIENT)
Dept: FAMILY MEDICINE CLINIC | Facility: CLINIC | Age: 46
End: 2021-01-11

## 2021-01-11 DIAGNOSIS — E61.1 IRON DEFICIENCY: ICD-10-CM

## 2021-01-11 DIAGNOSIS — E78.00 HYPERCHOLESTEREMIA: Primary | ICD-10-CM

## 2021-01-11 NOTE — TELEPHONE ENCOUNTER
----- Message from Duarte Pena MD sent at 1/11/2021 10:20 AM CST -----  Iron remains low. Iron stores are low. Vitalnutrients. net  (0528 access number) for vitamins  Iron plus c bid x 3  Months and recheck iron.    This may be contributing to RLS and

## 2021-01-11 NOTE — TELEPHONE ENCOUNTER
Patient informed of the below results and recommendations. Patient will c/b to schedule the appropriate lab appts. Orders pended. Please advise.

## 2021-01-12 PROBLEM — F32.81 PMDD (PREMENSTRUAL DYSPHORIC DISORDER): Status: ACTIVE | Noted: 2021-01-12

## 2021-01-12 NOTE — PROGRESS NOTES
[facility] [place]  PROGRESS NOTE        HPI:   This is a 39year old female coming in for Patient presents with:  Checkup: Depression meds, massive mood swings the week before her period, tested for covid antibodies    HPI  Still having a lot o Glucose 94 70 - 99 mg/dL    Sodium 137 136 - 145 mmol/L    Potassium 3.8 3.5 - 5.1 mmol/L    Chloride 106 98 - 112 mmol/L    CO2 29.0 21.0 - 32.0 mmol/L    Anion Gap 2 0 - 18 mmol/L    BUN 12 7 - 18 mg/dL    Creatinine 0.85 0.55 - 1.02 mg/dL    BUN/CREA Ra Meds:  Current Outpatient Medications   Medication Sig Dispense Refill   • buPROPion HCl ER, XL, 300 MG Oral Tablet 24 Hr Take 1 tablet (300 mg total) by mouth daily. 90 tablet 3   • atorvastatin 20 MG Oral Tab Take 1 tablet (20 mg total) by mouth daily.  9 as of this encounter: 160 lb 3.2 oz (72.7 kg).    Wt Readings from Last 6 Encounters:  01/12/21 : 160 lb 3.2 oz (72.7 kg)  09/17/20 : 159 lb 6.4 oz (72.3 kg)  07/28/20 : 164 lb (74.4 kg)  07/24/20 : 164 lb (74.4 kg)  06/26/20 : 166 lb (75.3 kg)  06/04/20 : appointment:    Your appointments     Date & Time Appointment Department [facility])    Angel 15, 2021  3:00 PM CST Video Visit with Yanely Cano, [address] [facility] [place]  ([facility])    You must be in the state of [place] Immunizations  Administered            Date(s) Administered    FLULAVAL 6 months & older 0.5 ml Prefilled syringe (63697)                          09/17/2020      TDAP                  02/06/2017      \" This note was created utilizing Dragon speech recogn

## 2021-01-12 NOTE — PATIENT INSTRUCTIONS
You have been scheduled for a sleep study at the [address] [place] [address]. Please  Call the sleep center at  to review forms and receive forms to fill out in the mail. The Sleep Center will pre-certify your sleep study with your insurance.

## 2021-01-27 ENCOUNTER — OFFICE VISIT (OUTPATIENT)
Dept: SLEEP CENTER | Age: 46
End: 2021-01-27
Attending: FAMILY MEDICINE
Payer: COMMERCIAL

## 2021-01-27 PROCEDURE — 95810 POLYSOM 6/> YRS 4/> PARAM: CPT

## 2021-02-01 ENCOUNTER — TELEPHONE (OUTPATIENT)
Dept: FAMILY MEDICINE CLINIC | Facility: CLINIC | Age: 46
End: 2021-02-01

## 2021-02-01 ENCOUNTER — SLEEP STUDY (OUTPATIENT)
Dept: FAMILY MEDICINE CLINIC | Facility: CLINIC | Age: 46
End: 2021-02-01
Payer: COMMERCIAL

## 2021-02-01 DIAGNOSIS — G47.34 HYPOXIA, SLEEP RELATED: Primary | ICD-10-CM

## 2021-02-01 PROCEDURE — 95810 POLYSOM 6/> YRS 4/> PARAM: CPT | Performed by: FAMILY MEDICINE

## 2021-02-01 NOTE — TELEPHONE ENCOUNTER
Spoke with patient regarding sleep study. Dr. Trevor Wood recommended in person consult. Scheduled pt appt and updated flowsheet.

## 2021-02-01 NOTE — TELEPHONE ENCOUNTER
----- Message from Mir Lovelace MD sent at 2/1/2021  2:45 PM CST -----  Please notify patient sleep study is read. Update flow sheet   Schedule in office follow up. Sleep hygiene should be review to assess factors that may improve sleep quality.     Weigh

## 2021-02-06 DIAGNOSIS — E78.49 FAMILIAL MULTIPLE LIPOPROTEIN-TYPE HYPERLIPIDEMIA: ICD-10-CM

## 2021-02-08 ENCOUNTER — OFFICE VISIT (OUTPATIENT)
Dept: FAMILY MEDICINE CLINIC | Facility: CLINIC | Age: 46
End: 2021-02-08
Payer: COMMERCIAL

## 2021-02-08 VITALS
SYSTOLIC BLOOD PRESSURE: 142 MMHG | TEMPERATURE: 98 F | BODY MASS INDEX: 27.49 KG/M2 | DIASTOLIC BLOOD PRESSURE: 88 MMHG | HEIGHT: 64.5 IN | RESPIRATION RATE: 16 BRPM | OXYGEN SATURATION: 99 % | WEIGHT: 163 LBS | HEART RATE: 79 BPM

## 2021-02-08 DIAGNOSIS — S03.00XA DISLOCATION OF TEMPOROMANDIBULAR JOINT, INITIAL ENCOUNTER: ICD-10-CM

## 2021-02-08 DIAGNOSIS — I10 ESSENTIAL HYPERTENSION: ICD-10-CM

## 2021-02-08 DIAGNOSIS — R06.83 SNORING: Primary | ICD-10-CM

## 2021-02-08 PROCEDURE — 3079F DIAST BP 80-89 MM HG: CPT | Performed by: FAMILY MEDICINE

## 2021-02-08 PROCEDURE — 3077F SYST BP >= 140 MM HG: CPT | Performed by: FAMILY MEDICINE

## 2021-02-08 PROCEDURE — 99214 OFFICE O/P EST MOD 30 MIN: CPT | Performed by: FAMILY MEDICINE

## 2021-02-08 PROCEDURE — 3008F BODY MASS INDEX DOCD: CPT | Performed by: FAMILY MEDICINE

## 2021-02-08 RX ORDER — ZOLPIDEM TARTRATE 10 MG/1
10 TABLET ORAL NIGHTLY
Qty: 30 TABLET | Refills: 3 | Status: SHIPPED | OUTPATIENT
Start: 2021-02-08 | End: 2022-02-03

## 2021-02-08 RX ORDER — HYDROCHLOROTHIAZIDE 12.5 MG/1
12.5 TABLET ORAL DAILY
Qty: 90 TABLET | Refills: 3 | Status: SHIPPED | OUTPATIENT
Start: 2021-02-08 | End: 2021-10-05

## 2021-02-08 RX ORDER — ATORVASTATIN CALCIUM 20 MG/1
TABLET, FILM COATED ORAL
Qty: 90 TABLET | Refills: 1 | Status: SHIPPED | OUTPATIENT
Start: 2021-02-08 | End: 2021-04-08

## 2021-02-08 NOTE — TELEPHONE ENCOUNTER
.  Future appt:     Your appointments     Date & Time Appointment Department [facility])    Feb 08, 2021  3:40 PM CST Exam - Established with Joe Flores MD [facility], [facility]            [address] [facility]

## 2021-02-08 NOTE — PROGRESS NOTES
[facility] [place]  PROGRESS NOTE        HPI:   This is a 39year old female coming in for Patient presents with: Follow - Up: sleep study    HPI patient is overall feeling tired most of the time.    She notes that she is a very light sleeper an Non- 83 >=60    GFR, -American 96 >=60    AST 15 15 - 37 U/L    ALT 25 13 - 56 U/L    Alkaline Phosphatase 61 37 - 98 U/L    Bilirubin, Total 0.4 0.1 - 2.0 mg/dL    Total Protein 7.4 6.4 - 8.2 g/dL    Albumin 4.0 3.4 - 5.0 g/dL    Gl Tablet 24 Hr Take 1 tablet (300 mg total) by mouth daily. 90 tablet 3   • Acidophilus/Pectin Oral Cap Take 1 capsule by mouth 2 (two) times daily. • Cholecalciferol (VITAMIN D) 1000 units Oral Tab Take 1 tablet by mouth daily.       • Ferrous Sulfate (F Encounters:  02/08/21 : 142/88  01/12/21 : 130/70  09/17/20 : 130/86       Vital signs reviewed. Physical Exam   Constitutional: She is oriented to person, place, and time. She appears well-developed and well-nourished.    HENT:   Head: Normocephalic and a file.    [facility] for this Visit:  Requested Prescriptions     Signed Prescriptions Disp Refills   • Zolpidem Tartrate 10 MG Oral Tab 30 tablet 3     Sig: Take 1 tablet (10 mg total) by mouth nightly.    • hydrochlorothiazide 12.5 MG Oral Tab 90 table

## 2021-02-08 NOTE — PATIENT INSTRUCTIONS
TMJ Syndrome  The temporomandibular joint (TMJ) is the joint that connects your lower jaw to your skull. You can feel it in front of your ears when you open and close your mouth.  TMJ disorders cause chronic or recurrent pain and problems in the jaw joint · You may take acetaminophen or ibuprofen for pain, unless you were given a different pain medicine.  (Note: If you have chronic liver or kidney disease or have ever had a stomach ulcer or gastrointestinal bleeding, talk with your healthcare provider before Follow up with your healthcare provider, or as advised. More testing and other treatment may be needed. If changes to your lifestyle do not improve your symptoms, talk with your healthcare provider about other treatments.  These include bite guards for help

## 2021-03-08 ENCOUNTER — OFFICE VISIT (OUTPATIENT)
Dept: FAMILY MEDICINE CLINIC | Facility: CLINIC | Age: 46
End: 2021-03-08
Payer: COMMERCIAL

## 2021-03-08 VITALS
DIASTOLIC BLOOD PRESSURE: 100 MMHG | BODY MASS INDEX: 27.83 KG/M2 | HEIGHT: 64.5 IN | OXYGEN SATURATION: 98 % | WEIGHT: 165 LBS | HEART RATE: 94 BPM | SYSTOLIC BLOOD PRESSURE: 150 MMHG | TEMPERATURE: 99 F | RESPIRATION RATE: 16 BRPM

## 2021-03-08 DIAGNOSIS — I10 ESSENTIAL HYPERTENSION: Primary | ICD-10-CM

## 2021-03-08 LAB
ALBUMIN SERPL-MCNC: 3.6 G/DL (ref 3.4–5)
ALBUMIN/GLOB SERPL: 1.1 {RATIO} (ref 1–2)
ALP LIVER SERPL-CCNC: 69 U/L
ALT SERPL-CCNC: 27 U/L
ANION GAP SERPL CALC-SCNC: 6 MMOL/L (ref 0–18)
AST SERPL-CCNC: 12 U/L (ref 15–37)
BILIRUB SERPL-MCNC: 0.3 MG/DL (ref 0.1–2)
BUN BLD-MCNC: 10 MG/DL (ref 7–18)
BUN/CREAT SERPL: 11.5 (ref 10–20)
CALCIUM BLD-MCNC: 9.3 MG/DL (ref 8.5–10.1)
CHLORIDE SERPL-SCNC: 103 MMOL/L (ref 98–112)
CO2 SERPL-SCNC: 30 MMOL/L (ref 21–32)
CREAT BLD-MCNC: 0.87 MG/DL
GLOBULIN PLAS-MCNC: 3.2 G/DL (ref 2.8–4.4)
GLUCOSE BLD-MCNC: 92 MG/DL (ref 70–99)
M PROTEIN MFR SERPL ELPH: 6.8 G/DL (ref 6.4–8.2)
OSMOLALITY SERPL CALC.SUM OF ELEC: 287 MOSM/KG (ref 275–295)
PATIENT FASTING Y/N/NP: NO
POTASSIUM SERPL-SCNC: 3.2 MMOL/L (ref 3.5–5.1)
SODIUM SERPL-SCNC: 139 MMOL/L (ref 136–145)

## 2021-03-08 PROCEDURE — 99214 OFFICE O/P EST MOD 30 MIN: CPT | Performed by: FAMILY MEDICINE

## 2021-03-08 PROCEDURE — 80053 COMPREHEN METABOLIC PANEL: CPT | Performed by: FAMILY MEDICINE

## 2021-03-08 PROCEDURE — 3008F BODY MASS INDEX DOCD: CPT | Performed by: FAMILY MEDICINE

## 2021-03-08 PROCEDURE — 3080F DIAST BP >= 90 MM HG: CPT | Performed by: FAMILY MEDICINE

## 2021-03-08 PROCEDURE — 3077F SYST BP >= 140 MM HG: CPT | Performed by: FAMILY MEDICINE

## 2021-03-08 RX ORDER — METOPROLOL SUCCINATE 25 MG/1
25 TABLET, EXTENDED RELEASE ORAL DAILY
Qty: 30 TABLET | Refills: 3 | Status: SHIPPED | OUTPATIENT
Start: 2021-03-08 | End: 2021-07-09

## 2021-03-08 NOTE — PROGRESS NOTES
[facility] [place]  PROGRESS NOTE        HPI:   This is a 39year old female coming in for Patient presents with: Follow - Up    HPIpatient is here for a follow up of her blood pressure. Overall doing ok    no new concerns.  Except her blood p - 2.0    FASTING Yes    CK CREATINE KINASE (NOT CREATININE)   Result Value Ref Range    CK 98 26 - 192 U/L       Past Medical History:   Diagnosis Date   • Allergic rhinitis    • Asthma    • Calculus of kidney    • Essential hypertension    • Hyperlipidemi total) by mouth daily. 90 tablet 3   • Acidophilus/Pectin Oral Cap Take 1 capsule by mouth 2 (two) times daily. • Cholecalciferol (VITAMIN D) 1000 units Oral Tab Take 1 tablet by mouth daily.       • Ferrous Sulfate (FEOSOL) 325 (65 Fe) MG Oral Tab Take Normal rate and regular rhythm. Heart sounds: Normal heart sounds. Pulmonary:      Effort: Pulmonary effort is normal.      Breath sounds: Normal breath sounds. Abdominal:      General: Bowel sounds are normal. There is no distension.       Birtha  steps: 1. Close out all other open apps (could be competing for audio resources)  2. Disable Bluetooth  3.       Reboot mobile device before joining the video  4.        Come off Wi-Fi and switch over to Data    Please see our Video Visit

## 2021-03-09 ENCOUNTER — TELEPHONE (OUTPATIENT)
Dept: FAMILY MEDICINE CLINIC | Facility: CLINIC | Age: 46
End: 2021-03-09

## 2021-03-09 NOTE — TELEPHONE ENCOUNTER
----- Message from Whitley Rodriguez MD sent at 3/9/2021  9:10 AM CST -----  Potassium fel from hydrochlorothiazide,   Recommend increase potassium in diet:  banana, etc.

## 2021-03-13 DIAGNOSIS — E78.49 FAMILIAL MULTIPLE LIPOPROTEIN-TYPE HYPERLIPIDEMIA: ICD-10-CM

## 2021-03-15 RX ORDER — ATORVASTATIN CALCIUM 20 MG/1
TABLET, FILM COATED ORAL
Qty: 90 TABLET | Refills: 1 | OUTPATIENT
Start: 2021-03-15

## 2021-03-27 DIAGNOSIS — E78.49 FAMILIAL MULTIPLE LIPOPROTEIN-TYPE HYPERLIPIDEMIA: ICD-10-CM

## 2021-03-29 RX ORDER — ATORVASTATIN CALCIUM 20 MG/1
20 TABLET, FILM COATED ORAL DAILY
Qty: 90 TABLET | Refills: 1 | OUTPATIENT
Start: 2021-03-29

## 2021-03-29 NOTE — TELEPHONE ENCOUNTER
Patient is not due for another script for another 5 months. Narratot message sent. Future appt:     Your appointments     Date & Time Appointment Department [facility])    Apr 08, 2021  4:20 PM CDT Follow up Visit with MD Gaby Brooks 26, dial 911 immediately.               [address] [facility], [address] [facility] [place]  [address]  [facility] [id number] [address] [facility] [place]  [place]

## 2021-04-08 ENCOUNTER — OFFICE VISIT (OUTPATIENT)
Dept: FAMILY MEDICINE CLINIC | Facility: CLINIC | Age: 46
End: 2021-04-08
Payer: COMMERCIAL

## 2021-04-08 VITALS
SYSTOLIC BLOOD PRESSURE: 132 MMHG | DIASTOLIC BLOOD PRESSURE: 78 MMHG | OXYGEN SATURATION: 99 % | BODY MASS INDEX: 27.83 KG/M2 | TEMPERATURE: 97 F | RESPIRATION RATE: 16 BRPM | HEIGHT: 64.5 IN | WEIGHT: 165 LBS | HEART RATE: 73 BPM

## 2021-04-08 DIAGNOSIS — I10 ESSENTIAL HYPERTENSION: Primary | ICD-10-CM

## 2021-04-08 DIAGNOSIS — E78.49 FAMILIAL MULTIPLE LIPOPROTEIN-TYPE HYPERLIPIDEMIA: ICD-10-CM

## 2021-04-08 PROCEDURE — 99213 OFFICE O/P EST LOW 20 MIN: CPT | Performed by: FAMILY MEDICINE

## 2021-04-08 PROCEDURE — 3075F SYST BP GE 130 - 139MM HG: CPT | Performed by: FAMILY MEDICINE

## 2021-04-08 PROCEDURE — 3008F BODY MASS INDEX DOCD: CPT | Performed by: FAMILY MEDICINE

## 2021-04-08 PROCEDURE — 3078F DIAST BP <80 MM HG: CPT | Performed by: FAMILY MEDICINE

## 2021-04-08 RX ORDER — ATORVASTATIN CALCIUM 20 MG/1
20 TABLET, FILM COATED ORAL DAILY
Qty: 90 TABLET | Refills: 1 | Status: SHIPPED | OUTPATIENT
Start: 2021-04-08 | End: 2021-10-12

## 2021-04-08 NOTE — PROGRESS NOTES
[facility] [place]  PROGRESS NOTE        HPI:   This is a 39year old female coming in for Patient presents with: Follow - Up: BP check. Not taking lipitor because [facility] wont give it to her.  Has rash on lback of left thigh    HPI blood pre Spouses Health: healthy       Number of Children: 3      Sikhism/ Sabianism: Muslim      Other Social History Comments:     Social History    Tobacco Use      Smoking status: Never Smoker      Smokeless tobacco: Never Used    Vaping Use      Vaping Use: N hypertension      REVIEW OF SYSTEMS:   Review of Systems   Constitutional: Negative. HENT: Negative. Eyes: Negative. Respiratory: Negative. Cardiovascular: Negative. Gastrointestinal: Negative. Negative for diarrhea. Endocrine: Negative. Bowel sounds are normal. There is no distension. Palpations: Abdomen is soft. Musculoskeletal:         General: Normal range of motion. Cervical back: Normal range of motion and neck supple. Skin:     General: Skin is warm and dry.       Eldon Moles below steps:        1. Close out all other open apps (could be competing for audio resources)  2. Disable Bluetooth  3.       Reboot mobile device before joining the video  4.        Come off Wi-Fi and switch over to Data    Please see our Video

## 2021-05-25 ENCOUNTER — TELEPHONE (OUTPATIENT)
Dept: FAMILY MEDICINE CLINIC | Facility: CLINIC | Age: 46
End: 2021-05-25

## 2021-05-25 NOTE — TELEPHONE ENCOUNTER
Dr. Morelia Pizano wanted her to have some blood drawn, are wqe using the ordering from January? Please let patient know.   Future Appointments   Date Time Provider Rachel Leo   6/11/2021  3:30 PM Wilda Duran [facility] [place]

## 2021-05-27 ENCOUNTER — LAB ENCOUNTER (OUTPATIENT)
Dept: LAB | Age: 46
End: 2021-05-27
Attending: FAMILY MEDICINE
Payer: COMMERCIAL

## 2021-05-27 DIAGNOSIS — E78.00 HYPERCHOLESTEREMIA: ICD-10-CM

## 2021-05-27 DIAGNOSIS — E61.1 IRON DEFICIENCY: ICD-10-CM

## 2021-05-27 PROCEDURE — 80053 COMPREHEN METABOLIC PANEL: CPT | Performed by: FAMILY MEDICINE

## 2021-05-27 PROCEDURE — 82550 ASSAY OF CK (CPK): CPT | Performed by: FAMILY MEDICINE

## 2021-05-27 PROCEDURE — 80061 LIPID PANEL: CPT | Performed by: FAMILY MEDICINE

## 2021-05-27 PROCEDURE — 82728 ASSAY OF FERRITIN: CPT | Performed by: FAMILY MEDICINE

## 2021-05-27 PROCEDURE — 83540 ASSAY OF IRON: CPT | Performed by: FAMILY MEDICINE

## 2021-05-27 PROCEDURE — 83550 IRON BINDING TEST: CPT | Performed by: FAMILY MEDICINE

## 2021-05-28 ENCOUNTER — TELEPHONE (OUTPATIENT)
Dept: FAMILY MEDICINE CLINIC | Facility: CLINIC | Age: 46
End: 2021-05-28

## 2021-05-28 NOTE — TELEPHONE ENCOUNTER
LM for patient to return call for lab results or with any questions if she has viewed them on [employer].

## 2021-05-28 NOTE — TELEPHONE ENCOUNTER
Patient returned call to determine when she should be scheduling next follow up appointment and next physical. Patient was advised based on most recent visit notes.     Patient also reports she was given hormones to lower her cortisol level and is sleeping

## 2021-05-28 NOTE — TELEPHONE ENCOUNTER
----- Message from Rama Bryson MD sent at 5/27/2021  5:02 PM CDT -----  Iron stores are low. Vitalnutrients. net  (0528 access number) for vitamins  Iron plus c twice a day x 3  Months and recheck iron.    This may be contributing to RLS and PLMD.   Goal

## 2021-07-09 RX ORDER — METOPROLOL SUCCINATE 25 MG/1
TABLET, EXTENDED RELEASE ORAL
Qty: 30 TABLET | Refills: 3 | Status: SHIPPED | OUTPATIENT
Start: 2021-07-09 | End: 2021-10-05

## 2021-07-09 NOTE — TELEPHONE ENCOUNTER
Future appt:    Last Appointment with provider:   4/8/2021 for htn follow up. Return in 6 months (10/8/21).   Last appointment at [facility]:  4/8/2021  Cholesterol, Total (mg/dL)   Date Value   05/27/2021 185     HDL Cholesterol (mg/dL)   Date Value   05

## 2021-10-05 ENCOUNTER — OFFICE VISIT (OUTPATIENT)
Dept: FAMILY MEDICINE CLINIC | Facility: CLINIC | Age: 46
End: 2021-10-05
Payer: COMMERCIAL

## 2021-10-05 VITALS
TEMPERATURE: 98 F | DIASTOLIC BLOOD PRESSURE: 88 MMHG | WEIGHT: 175 LBS | BODY MASS INDEX: 29.51 KG/M2 | RESPIRATION RATE: 18 BRPM | OXYGEN SATURATION: 99 % | HEART RATE: 72 BPM | HEIGHT: 64.5 IN | SYSTOLIC BLOOD PRESSURE: 130 MMHG

## 2021-10-05 DIAGNOSIS — E78.49 FAMILIAL MULTIPLE LIPOPROTEIN-TYPE HYPERLIPIDEMIA: ICD-10-CM

## 2021-10-05 DIAGNOSIS — I10 ESSENTIAL HYPERTENSION: Primary | ICD-10-CM

## 2021-10-05 PROCEDURE — 3008F BODY MASS INDEX DOCD: CPT | Performed by: FAMILY MEDICINE

## 2021-10-05 PROCEDURE — 3079F DIAST BP 80-89 MM HG: CPT | Performed by: FAMILY MEDICINE

## 2021-10-05 PROCEDURE — 99214 OFFICE O/P EST MOD 30 MIN: CPT | Performed by: FAMILY MEDICINE

## 2021-10-05 PROCEDURE — 3075F SYST BP GE 130 - 139MM HG: CPT | Performed by: FAMILY MEDICINE

## 2021-10-05 RX ORDER — HYDROCHLOROTHIAZIDE 25 MG/1
25 TABLET ORAL DAILY
Qty: 30 TABLET | Refills: 3 | Status: SHIPPED | OUTPATIENT
Start: 2021-10-05

## 2021-10-05 NOTE — PROGRESS NOTES
[facility] [place]  PROGRESS NOTE        HPI:   This is a 39year old female coming in for Patient presents with:  Blood Pressure    HPI  She states she is very stressed at work.   She notes that she continues to work on stress reduction and self HDL Chol 130 (H) <130 mg/dL    FASTING Yes    FERRITIN   Result Value Ref Range    Ferritin 27.0 12.0 - 240.0 ng/mL   IRON AND TIBC   Result Value Ref Range    Iron 73 50 - 170 ug/dL    Transferrin 259 200 - 360 mg/dL    Total Iron Binding Capacity 386 240 tablet 3   • Acidophilus/Pectin Oral Cap Take 1 capsule by mouth 2 (two) times daily. • Cholecalciferol (VITAMIN D) 1000 units Oral Tab Take 1 tablet by mouth daily.       • Ferrous Sulfate 325 (65 Fe) MG Oral Tab Take 1 tablet by mouth 2 (two) times da reviewed. Physical Exam  Constitutional:       Appearance: She is well-developed. She is not diaphoretic. HENT:      Head: Normocephalic and atraumatic.       Right Ear: External ear normal.      Left Ear: External ear normal.      Nose: Nose normal.   E complications, allergies, or worsening or changing symptoms. Parent is to call with any side effects or complications from the treatments as a result of today.        Rianna Leblanc MD  10/5/2021  4:35 PM  Immunization History   Administered Date(s) Adminis

## 2021-10-12 DIAGNOSIS — E78.49 FAMILIAL MULTIPLE LIPOPROTEIN-TYPE HYPERLIPIDEMIA: ICD-10-CM

## 2021-10-12 RX ORDER — ATORVASTATIN CALCIUM 20 MG/1
TABLET, FILM COATED ORAL
Qty: 90 TABLET | Refills: 0 | Status: SHIPPED | OUTPATIENT
Start: 2021-10-12

## 2021-10-12 NOTE — TELEPHONE ENCOUNTER
Future appt:     Your appointments     Date & Time Appointment Department [facility])    Nov 05, 2021  8:15 AM CDT Laboratory Visit with [facility] ([facility])        Nov 18, 2021  4:00 PM CST Physical - Established with Mock,

## 2021-11-05 ENCOUNTER — LABORATORY ENCOUNTER (OUTPATIENT)
Dept: LAB | Age: 46
End: 2021-11-05
Attending: FAMILY MEDICINE
Payer: COMMERCIAL

## 2021-11-05 DIAGNOSIS — I10 ESSENTIAL HYPERTENSION: ICD-10-CM

## 2021-11-05 DIAGNOSIS — E78.49 FAMILIAL MULTIPLE LIPOPROTEIN-TYPE HYPERLIPIDEMIA: ICD-10-CM

## 2021-11-05 PROCEDURE — 80053 COMPREHEN METABOLIC PANEL: CPT | Performed by: FAMILY MEDICINE

## 2021-11-08 ENCOUNTER — TELEPHONE (OUTPATIENT)
Dept: FAMILY MEDICINE CLINIC | Facility: CLINIC | Age: 46
End: 2021-11-08

## 2021-11-08 NOTE — TELEPHONE ENCOUNTER
Patient was fasting. Patient is not taking Tylenol or alcohol, but she has been seeing Anahi at [facility] for weight loss and has been taking Q-Symia for the past 3 months. Please advise whether this may be affecting lab values.      Patient has appo

## 2021-11-08 NOTE — TELEPHONE ENCOUNTER
----- Message from NELDA Ly sent at 11/8/2021 11:46 AM CST -----  Dr. Rashaun Dailey is out of the office today. If the lab was done fasting, blood sugar is elevated as well one of the liver enzymes.  Is there any Tylenol (or products containing Tylenol

## 2021-11-09 ENCOUNTER — PATIENT MESSAGE (OUTPATIENT)
Dept: FAMILY MEDICINE CLINIC | Facility: CLINIC | Age: 46
End: 2021-11-09

## 2021-11-09 NOTE — TELEPHONE ENCOUNTER
Please let patient know that I can't find that this combination drug causes any changes in liver enzymes, however this combination is relatively new. Recommend holding and rechecking levels at upcoming appointment.

## 2021-11-09 NOTE — TELEPHONE ENCOUNTER
Spoke with patient.    She will discuss concerns with shayla during her physical.  Future Appointments   Date Time Provider Rachel Leo   11/18/2021  4:15 PM NELDA [facility] [place] [facility] [place]

## 2021-11-09 NOTE — TELEPHONE ENCOUNTER
Spoke with patient.  She is going to hold medication and discuss concerns when she sees shayla    Future Appointments   Date Time Provider Rachel Leo   11/18/2021  4:15 PM NELDA [facility] [place] [facility]

## 2021-11-09 NOTE — TELEPHONE ENCOUNTER
From: Davide Vazquez  To: Nadine Herrera MD  Sent: 11/9/2021 6:29 AM CST  Subject: Question regarding COMP METABOLIC PANEL (14)    Were my iron levels and lipid levels checked this time?   I am slightly concerned that my glucose levels continue to rise as juan

## 2021-11-18 ENCOUNTER — OFFICE VISIT (OUTPATIENT)
Dept: FAMILY MEDICINE CLINIC | Facility: CLINIC | Age: 46
End: 2021-11-18
Payer: COMMERCIAL

## 2021-11-18 VITALS
BODY MASS INDEX: 30.9 KG/M2 | HEIGHT: 64 IN | DIASTOLIC BLOOD PRESSURE: 78 MMHG | HEART RATE: 73 BPM | WEIGHT: 181 LBS | TEMPERATURE: 98 F | OXYGEN SATURATION: 100 % | SYSTOLIC BLOOD PRESSURE: 138 MMHG | RESPIRATION RATE: 18 BRPM

## 2021-11-18 DIAGNOSIS — R73.9 HYPERGLYCEMIA: ICD-10-CM

## 2021-11-18 DIAGNOSIS — Z00.00 ENCOUNTER FOR HEALTH MAINTENANCE EXAMINATION IN ADULT: Primary | ICD-10-CM

## 2021-11-18 DIAGNOSIS — Z23 NEED FOR VACCINATION: ICD-10-CM

## 2021-11-18 DIAGNOSIS — Z01.419 ENCOUNTER FOR GYNECOLOGICAL EXAMINATION: ICD-10-CM

## 2021-11-18 PROCEDURE — 90686 IIV4 VACC NO PRSV 0.5 ML IM: CPT | Performed by: NURSE PRACTITIONER

## 2021-11-18 PROCEDURE — 90471 IMMUNIZATION ADMIN: CPT | Performed by: NURSE PRACTITIONER

## 2021-11-18 PROCEDURE — 87624 HPV HI-RISK TYP POOLED RSLT: CPT | Performed by: NURSE PRACTITIONER

## 2021-11-18 PROCEDURE — 3075F SYST BP GE 130 - 139MM HG: CPT | Performed by: NURSE PRACTITIONER

## 2021-11-18 PROCEDURE — 3008F BODY MASS INDEX DOCD: CPT | Performed by: NURSE PRACTITIONER

## 2021-11-18 PROCEDURE — 99396 PREV VISIT EST AGE 40-64: CPT | Performed by: NURSE PRACTITIONER

## 2021-11-18 PROCEDURE — 3078F DIAST BP <80 MM HG: CPT | Performed by: NURSE PRACTITIONER

## 2021-11-18 PROCEDURE — 99213 OFFICE O/P EST LOW 20 MIN: CPT | Performed by: NURSE PRACTITIONER

## 2021-11-18 NOTE — PATIENT INSTRUCTIONS
Follow up for fasting blood work in January - A1 C      consider Rely on glucometer - from [facility] - check your blood sugar 2 hrs after meals -  Should be < 140     First thing in the morning is < 100     Eat foods in order of vegetables first then protein

## 2021-11-18 NOTE — PROGRESS NOTES
Subjective:   Patient ID: Lila Hastings is a 55year old female. HPI  Patient is here for her annual physical     Sees [facility] - for progesterone     Did have sleep apnea    Did have covid (disease) -     History of high cholesterol.     History Hearing, tympanic membrane, ear canal and external ear normal.      Left Ear: Hearing, tympanic membrane, ear canal and external ear normal.      Nose: Nose normal.      Mouth/Throat:      Mouth: Mucous membranes are moist.      Dentition: Normal dentition not friable  Musculoskeletal:         General: Normal range of motion. Cervical back: Normal range of motion and neck supple. Comments: Moves all 4 extremities, normal strength   Lymphadenopathy:      Cervical: No cervical adenopathy.       Upper proteins low in carbohydrates sugars and saturated fats–goal is for weight circumference of 35 inches or less at the [facility]    Pap smear obtained today results be back within a few days we will notify you.

## 2021-11-24 ENCOUNTER — TELEPHONE (OUTPATIENT)
Dept: FAMILY MEDICINE CLINIC | Facility: CLINIC | Age: 46
End: 2021-11-24

## 2021-11-24 NOTE — TELEPHONE ENCOUNTER
----- Message from NELDA Mosley sent at 11/24/2021  8:13 AM CST -----  Please make sure patient receives my chart message as below-Your Pap smear is within normal limits, and your HPV is negative recommend an annual physical each year-we can deter

## 2022-02-11 RX ORDER — HYDROCHLOROTHIAZIDE 25 MG/1
TABLET ORAL
Qty: 90 TABLET | Refills: 0 | Status: SHIPPED | OUTPATIENT
Start: 2022-02-11

## 2022-02-15 RX ORDER — ATORVASTATIN CALCIUM 20 MG/1
TABLET, FILM COATED ORAL
Qty: 90 TABLET | Refills: 0 | Status: SHIPPED | OUTPATIENT
Start: 2022-02-15

## 2022-02-15 NOTE — TELEPHONE ENCOUNTER
Future appt:    Last Appointment with provider:   10/5/2021 for HTN follow up. Last appointment at [facility]:  11/18/2021 with EL for annual physical.  Cholesterol, Total (mg/dL)   Date Value   05/27/2021 185     HDL Cholesterol (mg/dL)   Date Value   05/27/2021 55     LDL Cholesterol (mg/dL)   Date Value   05/27/2021 95     Triglycerides (mg/dL)   Date Value   05/27/2021 176 (H)     Lab Results   Component Value Date     07/21/2020    A1C 5.2 07/21/2020     Lab Results   Component Value Date    T4F 1.0 06/03/2020    TSH 0.749 01/08/2021       No follow-ups on file.

## 2022-03-10 RX ORDER — ATORVASTATIN CALCIUM 20 MG/1
TABLET, FILM COATED ORAL
Qty: 90 TABLET | Refills: 0 | OUTPATIENT
Start: 2022-03-10

## 2022-05-09 NOTE — TELEPHONE ENCOUNTER
Future appt:    Last Appointment with provider:   11/18/2021 for annual physical.  Last appointment at [facility]:  11/18/2021  Cholesterol, Total (mg/dL)   Date Value   05/27/2021 185     HDL Cholesterol (mg/dL)   Date Value   05/27/2021 55     LDL Cholesterol (mg/dL)   Date Value   05/27/2021 95     Triglycerides (mg/dL)   Date Value   05/27/2021 176 (H)     Lab Results   Component Value Date     07/21/2020    A1C 5.2 07/21/2020     Lab Results   Component Value Date    T4F 1.0 06/03/2020    TSH 0.749 01/08/2021       No follow-ups on file.

## 2022-05-10 RX ORDER — HYDROCHLOROTHIAZIDE 25 MG/1
TABLET ORAL
Qty: 90 TABLET | Refills: 0 | Status: SHIPPED | OUTPATIENT
Start: 2022-05-10

## 2022-08-08 DIAGNOSIS — E53.8 B12 DEFICIENCY: Primary | ICD-10-CM

## 2022-08-08 DIAGNOSIS — D50.9 IRON DEFICIENCY ANEMIA, UNSPECIFIED IRON DEFICIENCY ANEMIA TYPE: ICD-10-CM

## 2022-08-08 DIAGNOSIS — E55.9 VITAMIN D DEFICIENCY: ICD-10-CM

## 2022-08-08 NOTE — TELEPHONE ENCOUNTER
Future appt:    Last Appointment with provider:   Visit date not found  Last appointment at [facility]:  Visit date not found  Last px: 11/18/21 with EL  Last office visit with Dr. Jelena Chaidez: 10/5/21    hydrochlorothiazide refilled 5/10/22 for #90, 0 refills    Pt overdue for labs      Pt contacted- scheduled for lab tomorrow. Pt has at least one week of medication left. Routed to Dr. Ludmila Doyle is out of office. Future Appointments   Date Time Provider Rachel Leo   8/9/2022 10:45 AM REF [place] [facility]         Cholesterol, Total (mg/dL)   Date Value   05/27/2021 185     HDL Cholesterol (mg/dL)   Date Value   05/27/2021 55     LDL Cholesterol (mg/dL)   Date Value   05/27/2021 95     Triglycerides (mg/dL)   Date Value   05/27/2021 176 (H)     Lab Results   Component Value Date     07/21/2020    A1C 5.2 07/21/2020     Lab Results   Component Value Date    T4F 1.0 06/03/2020    TSH 0.749 01/08/2021       No follow-ups on file.

## 2022-08-09 ENCOUNTER — LABORATORY ENCOUNTER (OUTPATIENT)
Dept: LAB | Age: 47
End: 2022-08-09
Attending: FAMILY MEDICINE
Payer: COMMERCIAL

## 2022-08-09 DIAGNOSIS — E55.9 VITAMIN D DEFICIENCY: ICD-10-CM

## 2022-08-09 DIAGNOSIS — D50.9 IRON DEFICIENCY ANEMIA, UNSPECIFIED IRON DEFICIENCY ANEMIA TYPE: ICD-10-CM

## 2022-08-09 DIAGNOSIS — E53.8 B12 DEFICIENCY: ICD-10-CM

## 2022-08-09 LAB
DEPRECATED HBV CORE AB SER IA-ACNC: 60.7 NG/ML
IRON SATN MFR SERPL: 18 %
IRON SERPL-MCNC: 71 UG/DL
TIBC SERPL-MCNC: 392 UG/DL (ref 240–450)
TRANSFERRIN SERPL-MCNC: 263 MG/DL (ref 200–360)
VIT B12 SERPL-MCNC: 351 PG/ML (ref 193–986)
VIT D+METAB SERPL-MCNC: 39.3 NG/ML (ref 30–100)

## 2022-08-09 PROCEDURE — 82306 VITAMIN D 25 HYDROXY: CPT | Performed by: FAMILY MEDICINE

## 2022-08-09 PROCEDURE — 82728 ASSAY OF FERRITIN: CPT | Performed by: FAMILY MEDICINE

## 2022-08-09 PROCEDURE — 82607 VITAMIN B-12: CPT | Performed by: FAMILY MEDICINE

## 2022-08-09 PROCEDURE — 83550 IRON BINDING TEST: CPT | Performed by: FAMILY MEDICINE

## 2022-08-09 PROCEDURE — 83540 ASSAY OF IRON: CPT | Performed by: FAMILY MEDICINE

## 2022-08-09 RX ORDER — HYDROCHLOROTHIAZIDE 25 MG/1
TABLET ORAL
Qty: 90 TABLET | Refills: 0 | Status: SHIPPED | OUTPATIENT
Start: 2022-08-09

## 2022-08-11 ENCOUNTER — TELEPHONE (OUTPATIENT)
Dept: FAMILY MEDICINE CLINIC | Facility: CLINIC | Age: 47
End: 2022-08-11

## 2022-08-11 DIAGNOSIS — D50.9 IRON DEFICIENCY ANEMIA, UNSPECIFIED IRON DEFICIENCY ANEMIA TYPE: ICD-10-CM

## 2022-08-11 DIAGNOSIS — E53.8 B12 DEFICIENCY: Primary | ICD-10-CM

## 2022-08-11 NOTE — TELEPHONE ENCOUNTER
Future labs entered as ordered below. LM for patient to return call or see mychart for lab results and recommendations.

## 2022-08-11 NOTE — TELEPHONE ENCOUNTER
----- Message from Quang Rubin MD sent at 8/11/2022 10:17 AM CDT -----  Vitamin B12 is subtherapeutic.   recommend \"B complex\" and recheck level in 6 months    Iron stores are low. [employer] net  (0528 access number) for vitamins  Iron plus c twice a day x 3  Months and recheck iron.      Vitamin D is barely to goal, consistent supplementation

## 2022-11-07 RX ORDER — HYDROCHLOROTHIAZIDE 25 MG/1
25 TABLET ORAL DAILY
Qty: 90 TABLET | Refills: 0 | Status: SHIPPED | OUTPATIENT
Start: 2022-11-07

## 2022-11-07 NOTE — TELEPHONE ENCOUNTER
Future appt: Your appointments     Date & Time Appointment Department [facility])    Nov 14, 2022  3:30 PM CST Physical - Established with Venkat Hernández MD [address] [facility] [place] ([facility])            [address] [place] [address] [place]  [facility] [id number]  [phone]      Last BW on 8/9/22. Last Appointment with provider:   Visit date not found  Last appointment at [facility]: 11/18/21 with EL for annual physical.  Cholesterol, Total (mg/dL)   Date Value   05/27/2021 185     HDL Cholesterol (mg/dL)   Date Value   05/27/2021 55     LDL Cholesterol (mg/dL)   Date Value   05/27/2021 95     Triglycerides (mg/dL)   Date Value   05/27/2021 176 (H)     Lab Results   Component Value Date     07/21/2020    A1C 5.2 07/21/2020     Lab Results   Component Value Date    T4F 1.0 06/03/2020    TSH 0.749 01/08/2021       No follow-ups on file. 1.42

## 2022-11-14 ENCOUNTER — OFFICE VISIT (OUTPATIENT)
Dept: FAMILY MEDICINE CLINIC | Facility: CLINIC | Age: 47
End: 2022-11-14
Payer: COMMERCIAL

## 2022-11-14 VITALS
RESPIRATION RATE: 16 BRPM | HEIGHT: 64 IN | TEMPERATURE: 98 F | HEART RATE: 98 BPM | WEIGHT: 172.81 LBS | OXYGEN SATURATION: 98 % | SYSTOLIC BLOOD PRESSURE: 154 MMHG | BODY MASS INDEX: 29.5 KG/M2 | DIASTOLIC BLOOD PRESSURE: 86 MMHG

## 2022-11-14 DIAGNOSIS — I10 ESSENTIAL HYPERTENSION: ICD-10-CM

## 2022-11-14 DIAGNOSIS — E53.8 B12 DEFICIENCY: ICD-10-CM

## 2022-11-14 DIAGNOSIS — Z00.00 WELLNESS EXAMINATION: Primary | ICD-10-CM

## 2022-11-14 DIAGNOSIS — K22.70 BARRETT'S ESOPHAGUS WITHOUT DYSPLASIA: ICD-10-CM

## 2022-11-14 DIAGNOSIS — D50.9 IRON DEFICIENCY ANEMIA, UNSPECIFIED IRON DEFICIENCY ANEMIA TYPE: ICD-10-CM

## 2022-11-14 DIAGNOSIS — E78.49 FAMILIAL MULTIPLE LIPOPROTEIN-TYPE HYPERLIPIDEMIA: ICD-10-CM

## 2022-11-14 DIAGNOSIS — R94.31 PROLONGED Q-T INTERVAL ON ECG: ICD-10-CM

## 2022-11-14 DIAGNOSIS — E55.9 VITAMIN D DEFICIENCY: ICD-10-CM

## 2022-11-14 DIAGNOSIS — E04.1 THYROID NODULE: ICD-10-CM

## 2022-11-14 RX ORDER — TRAZODONE HYDROCHLORIDE 50 MG/1
50 TABLET ORAL AS NEEDED
COMMUNITY
Start: 2022-09-13

## 2022-11-14 RX ORDER — LISINOPRIL 10 MG/1
10 TABLET ORAL DAILY
Qty: 30 TABLET | Refills: 3 | Status: SHIPPED | OUTPATIENT
Start: 2022-11-14 | End: 2023-11-09

## 2022-11-14 RX ORDER — BUPROPION HYDROCHLORIDE 75 MG/1
300 TABLET ORAL DAILY
COMMUNITY
Start: 2022-10-12

## 2022-11-14 RX ORDER — HYDROXYZINE PAMOATE 25 MG/1
25 CAPSULE ORAL 2 TIMES DAILY PRN
COMMUNITY
Start: 2022-10-25

## 2022-11-14 RX ORDER — ATORVASTATIN CALCIUM 20 MG/1
20 TABLET, FILM COATED ORAL DAILY
Qty: 90 TABLET | Refills: 0 | COMMUNITY
Start: 2022-11-14

## 2022-11-16 ENCOUNTER — LABORATORY ENCOUNTER (OUTPATIENT)
Dept: LAB | Age: 47
End: 2022-11-16
Attending: FAMILY MEDICINE
Payer: COMMERCIAL

## 2022-11-16 ENCOUNTER — HOSPITAL ENCOUNTER (OUTPATIENT)
Dept: ULTRASOUND IMAGING | Age: 47
Discharge: HOME | End: 2022-11-16
Attending: FAMILY MEDICINE
Payer: COMMERCIAL

## 2022-11-16 DIAGNOSIS — E55.9 VITAMIN D DEFICIENCY: ICD-10-CM

## 2022-11-16 DIAGNOSIS — E53.8 B12 DEFICIENCY: ICD-10-CM

## 2022-11-16 DIAGNOSIS — D50.9 IRON DEFICIENCY ANEMIA, UNSPECIFIED IRON DEFICIENCY ANEMIA TYPE: ICD-10-CM

## 2022-11-16 DIAGNOSIS — E04.1 THYROID NODULE: ICD-10-CM

## 2022-11-16 DIAGNOSIS — I10 ESSENTIAL HYPERTENSION: ICD-10-CM

## 2022-11-16 LAB
ALBUMIN SERPL-MCNC: 3.7 G/DL (ref 3.4–5)
ALBUMIN/GLOB SERPL: 1.3 {RATIO} (ref 1–2)
ALP LIVER SERPL-CCNC: 54 U/L
ALT SERPL-CCNC: 69 U/L
ANION GAP SERPL CALC-SCNC: 2 MMOL/L (ref 0–18)
AST SERPL-CCNC: 34 U/L (ref 15–37)
BASOPHILS # BLD AUTO: 0.06 X10(3) UL (ref 0–0.2)
BASOPHILS NFR BLD AUTO: 0.7 %
BILIRUB SERPL-MCNC: 0.4 MG/DL (ref 0.1–2)
BILIRUB UR QL STRIP.AUTO: NEGATIVE
BUN BLD-MCNC: 13 MG/DL (ref 7–18)
CALCIUM BLD-MCNC: 9.2 MG/DL (ref 8.5–10.1)
CHLORIDE SERPL-SCNC: 104 MMOL/L (ref 98–112)
CHOLEST SERPL-MCNC: 283 MG/DL (ref ?–200)
CK SERPL-CCNC: 110 U/L
CLARITY UR REFRACT.AUTO: CLEAR
CO2 SERPL-SCNC: 32 MMOL/L (ref 21–32)
CREAT BLD-MCNC: 0.9 MG/DL
DEPRECATED HBV CORE AB SER IA-ACNC: 79.9 NG/ML
EOSINOPHIL # BLD AUTO: 0.3 X10(3) UL (ref 0–0.7)
EOSINOPHIL NFR BLD AUTO: 3.7 %
ERYTHROCYTE [DISTWIDTH] IN BLOOD BY AUTOMATED COUNT: 14.1 %
FASTING PATIENT LIPID ANSWER: YES
FASTING STATUS PATIENT QL REPORTED: YES
GFR SERPLBLD BASED ON 1.73 SQ M-ARVRAT: 79 ML/MIN/1.73M2 (ref 60–?)
GLOBULIN PLAS-MCNC: 2.8 G/DL (ref 2.8–4.4)
GLUCOSE BLD-MCNC: 112 MG/DL (ref 70–99)
GLUCOSE UR STRIP.AUTO-MCNC: NEGATIVE MG/DL
HCT VFR BLD AUTO: 39.2 %
HDLC SERPL-MCNC: 42 MG/DL (ref 40–59)
HGB BLD-MCNC: 12.8 G/DL
IMM GRANULOCYTES # BLD AUTO: 0.02 X10(3) UL (ref 0–1)
IMM GRANULOCYTES NFR BLD: 0.2 %
IRON SATN MFR SERPL: 18 %
IRON SERPL-MCNC: 70 UG/DL
KETONES UR STRIP.AUTO-MCNC: NEGATIVE MG/DL
LDLC SERPL CALC-MCNC: 181 MG/DL (ref ?–100)
LEUKOCYTE ESTERASE UR QL STRIP.AUTO: NEGATIVE
LYMPHOCYTES # BLD AUTO: 2.16 X10(3) UL (ref 1–4)
LYMPHOCYTES NFR BLD AUTO: 26.8 %
MCH RBC QN AUTO: 29.4 PG (ref 26–34)
MCHC RBC AUTO-ENTMCNC: 32.7 G/DL (ref 31–37)
MCV RBC AUTO: 89.9 FL
MONOCYTES # BLD AUTO: 0.56 X10(3) UL (ref 0.1–1)
MONOCYTES NFR BLD AUTO: 6.9 %
NEUTROPHILS # BLD AUTO: 4.97 X10 (3) UL (ref 1.5–7.7)
NEUTROPHILS # BLD AUTO: 4.97 X10(3) UL (ref 1.5–7.7)
NEUTROPHILS NFR BLD AUTO: 61.7 %
NITRITE UR QL STRIP.AUTO: NEGATIVE
NONHDLC SERPL-MCNC: 241 MG/DL (ref ?–130)
OSMOLALITY SERPL CALC.SUM OF ELEC: 287 MOSM/KG (ref 275–295)
PH UR STRIP.AUTO: 7 [PH] (ref 5–8)
PLATELET # BLD AUTO: 271 10(3)UL (ref 150–450)
POTASSIUM SERPL-SCNC: 3.8 MMOL/L (ref 3.5–5.1)
PROT SERPL-MCNC: 6.5 G/DL (ref 6.4–8.2)
PROT UR STRIP.AUTO-MCNC: NEGATIVE MG/DL
RBC # BLD AUTO: 4.36 X10(6)UL
RBC UR QL AUTO: NEGATIVE
SODIUM SERPL-SCNC: 138 MMOL/L (ref 136–145)
SP GR UR STRIP.AUTO: 1.01 (ref 1–1.03)
T3FREE SERPL-MCNC: 2.56 PG/ML (ref 2.4–4.2)
T4 FREE SERPL-MCNC: 1 NG/DL (ref 0.8–1.7)
THYROGLOB SERPL-MCNC: <15 U/ML (ref ?–60)
THYROPEROXIDASE AB SERPL-ACNC: <28 U/ML (ref ?–60)
TIBC SERPL-MCNC: 386 UG/DL (ref 240–450)
TRANSFERRIN SERPL-MCNC: 259 MG/DL (ref 200–360)
TRIGL SERPL-MCNC: 307 MG/DL (ref 30–149)
TSI SER-ACNC: 0.74 MIU/ML (ref 0.36–3.74)
URATE SERPL-MCNC: 5.2 MG/DL
UROBILINOGEN UR STRIP.AUTO-MCNC: <2 MG/DL
VIT B12 SERPL-MCNC: 991 PG/ML (ref 193–986)
VIT D+METAB SERPL-MCNC: 42 NG/ML (ref 30–100)
VLDLC SERPL CALC-MCNC: 65 MG/DL (ref 0–30)
WBC # BLD AUTO: 8.1 X10(3) UL (ref 4–11)

## 2022-11-16 PROCEDURE — 83550 IRON BINDING TEST: CPT | Performed by: FAMILY MEDICINE

## 2022-11-16 PROCEDURE — 86800 THYROGLOBULIN ANTIBODY: CPT | Performed by: FAMILY MEDICINE

## 2022-11-16 PROCEDURE — 3044F HG A1C LEVEL LT 7.0%: CPT | Performed by: FAMILY MEDICINE

## 2022-11-16 PROCEDURE — 82728 ASSAY OF FERRITIN: CPT | Performed by: FAMILY MEDICINE

## 2022-11-16 PROCEDURE — 76536 US EXAM OF HEAD AND NECK: CPT | Performed by: FAMILY MEDICINE

## 2022-11-16 PROCEDURE — 84481 FREE ASSAY (FT-3): CPT | Performed by: FAMILY MEDICINE

## 2022-11-16 PROCEDURE — 86376 MICROSOMAL ANTIBODY EACH: CPT | Performed by: FAMILY MEDICINE

## 2022-11-16 PROCEDURE — 82306 VITAMIN D 25 HYDROXY: CPT | Performed by: FAMILY MEDICINE

## 2022-11-16 PROCEDURE — 81003 URINALYSIS AUTO W/O SCOPE: CPT | Performed by: FAMILY MEDICINE

## 2022-11-16 PROCEDURE — 84550 ASSAY OF BLOOD/URIC ACID: CPT | Performed by: FAMILY MEDICINE

## 2022-11-16 PROCEDURE — 80061 LIPID PANEL: CPT | Performed by: FAMILY MEDICINE

## 2022-11-16 PROCEDURE — 83540 ASSAY OF IRON: CPT | Performed by: FAMILY MEDICINE

## 2022-11-16 PROCEDURE — 82607 VITAMIN B-12: CPT | Performed by: FAMILY MEDICINE

## 2022-11-16 PROCEDURE — 80050 GENERAL HEALTH PANEL: CPT | Performed by: FAMILY MEDICINE

## 2022-11-16 PROCEDURE — 83036 HEMOGLOBIN GLYCOSYLATED A1C: CPT | Performed by: FAMILY MEDICINE

## 2022-11-16 PROCEDURE — 84439 ASSAY OF FREE THYROXINE: CPT | Performed by: FAMILY MEDICINE

## 2022-11-16 PROCEDURE — 82550 ASSAY OF CK (CPK): CPT | Performed by: FAMILY MEDICINE

## 2022-11-17 ENCOUNTER — TELEPHONE (OUTPATIENT)
Dept: FAMILY MEDICINE CLINIC | Facility: CLINIC | Age: 47
End: 2022-11-17

## 2022-11-17 DIAGNOSIS — R73.9 HYPERGLYCEMIA: Primary | ICD-10-CM

## 2022-11-17 DIAGNOSIS — E11.9 TYPE 2 DIABETES MELLITUS WITHOUT COMPLICATION, WITHOUT LONG-TERM CURRENT USE OF INSULIN (HCC): ICD-10-CM

## 2022-11-17 DIAGNOSIS — E78.49 FAMILIAL MULTIPLE LIPOPROTEIN-TYPE HYPERLIPIDEMIA: ICD-10-CM

## 2022-11-17 LAB
EST. AVERAGE GLUCOSE BLD GHB EST-MCNC: 128 MG/DL (ref 68–126)
HBA1C MFR BLD: 6.1 % (ref ?–5.7)

## 2022-11-17 NOTE — TELEPHONE ENCOUNTER
----- Message from Willy Potts MD sent at 11/17/2022  8:38 AM CST -----  Cholesterol is very high,   Would she like to start a statin medication? B12 is good. Thyroid is good  No evidence of Hashimoto's thyroiditis     Iron saturations are a bit low, but total iron and ferritin are normal.   Recommend healthy diet with leafy green iron containing vegetables. Sugar is elevated,   Add AIC. Avoid sweets, and complex carbs. ALT is high likely from fatty liver and elevated cholesterol. Consider starting statin and recheck in 6 weeks.      Vitamin D is close to goal.   Continue present managment

## 2022-11-18 NOTE — TELEPHONE ENCOUNTER
Patient is agreeable to starting a statin. However, she was on Atorvastatin previously and was discontinued due to muscle aches. Patient declines diabetic educator. States she knows what she should be doing and just hasn't been doing it. Will make an effort now that she is prediabetic. Patient is agreeable to blood sugar testing. Order pended below with brand most likely covered by insurance. Without insulin, will only authorize twice daily checks. Discussed with patient checking blood sugar before and 2 hours after meal, alternating time of day. Will bring record to 1 month follow up.     Future Appointments   Date Time Provider Rachel Leo   12/20/2022  4:40 PM Karen Loyd MD [facility]

## 2022-11-21 ENCOUNTER — PATIENT MESSAGE (OUTPATIENT)
Dept: FAMILY MEDICINE CLINIC | Facility: CLINIC | Age: 47
End: 2022-11-21

## 2022-11-21 DIAGNOSIS — E11.9 TYPE 2 DIABETES MELLITUS WITHOUT COMPLICATION, WITHOUT LONG-TERM CURRENT USE OF INSULIN (HCC): ICD-10-CM

## 2022-11-21 RX ORDER — BLOOD SUGAR DIAGNOSTIC
STRIP MISCELLANEOUS
Qty: 100 STRIP | Refills: 1 | Status: SHIPPED | OUTPATIENT
Start: 2022-11-21 | End: 2023-11-18

## 2022-11-21 RX ORDER — ROSUVASTATIN CALCIUM 5 MG/1
5 TABLET, COATED ORAL NIGHTLY
Qty: 30 TABLET | Refills: 3 | Status: SHIPPED | OUTPATIENT
Start: 2022-11-21

## 2022-11-21 RX ORDER — BLOOD-GLUCOSE METER
1 EACH MISCELLANEOUS 2 TIMES DAILY
Qty: 1 KIT | Refills: 0 | COMMUNITY
Start: 2022-11-21 | End: 2023-11-21

## 2022-11-21 RX ORDER — LANCETS 33 GAUGE
1 EACH MISCELLANEOUS 2 TIMES DAILY
Qty: 100 EACH | Refills: 0 | Status: SHIPPED | OUTPATIENT
Start: 2022-11-21 | End: 2023-11-21

## 2022-11-22 NOTE — TELEPHONE ENCOUNTER
From: Samaria Pineda  To: Balbina Santamaria MD  Sent: 11/21/2022 9:09 PM CST  Subject: Blood Glucose Monitoring Suppl ([employer] SYSTEM) w/Device    My pharmacy gave me the lancets and test strips, however, I did not get a lancet device. I do not see that on my list of prescriptions at [place] but I do see it in my chart. Is this something I need to purchase separately?

## 2022-11-30 RX ORDER — BLOOD-GLUCOSE METER
1 EACH MISCELLANEOUS 2 TIMES DAILY
Qty: 1 KIT | Refills: 0 | Status: SHIPPED | OUTPATIENT
Start: 2022-11-30 | End: 2023-11-30

## 2022-12-20 ENCOUNTER — OFFICE VISIT (OUTPATIENT)
Dept: FAMILY MEDICINE CLINIC | Facility: CLINIC | Age: 47
End: 2022-12-20
Payer: COMMERCIAL

## 2022-12-20 VITALS
HEART RATE: 98 BPM | HEIGHT: 64 IN | TEMPERATURE: 98 F | SYSTOLIC BLOOD PRESSURE: 134 MMHG | WEIGHT: 177.19 LBS | OXYGEN SATURATION: 98 % | DIASTOLIC BLOOD PRESSURE: 86 MMHG | RESPIRATION RATE: 16 BRPM | BODY MASS INDEX: 30.25 KG/M2

## 2022-12-20 DIAGNOSIS — E78.49 FAMILIAL MULTIPLE LIPOPROTEIN-TYPE HYPERLIPIDEMIA: Primary | ICD-10-CM

## 2022-12-20 DIAGNOSIS — E04.2 MULTIPLE THYROID NODULES: ICD-10-CM

## 2022-12-20 DIAGNOSIS — I10 ESSENTIAL HYPERTENSION: ICD-10-CM

## 2022-12-20 DIAGNOSIS — R73.03 PREDIABETES: ICD-10-CM

## 2022-12-20 LAB
GLUCOSE BLOOD: 109
TEST STRIP LOT #: NORMAL NUMERIC

## 2022-12-20 PROCEDURE — 3008F BODY MASS INDEX DOCD: CPT | Performed by: FAMILY MEDICINE

## 2022-12-20 PROCEDURE — 3075F SYST BP GE 130 - 139MM HG: CPT | Performed by: FAMILY MEDICINE

## 2022-12-20 PROCEDURE — 3079F DIAST BP 80-89 MM HG: CPT | Performed by: FAMILY MEDICINE

## 2022-12-20 PROCEDURE — 82947 ASSAY GLUCOSE BLOOD QUANT: CPT | Performed by: FAMILY MEDICINE

## 2022-12-20 PROCEDURE — 99214 OFFICE O/P EST MOD 30 MIN: CPT | Performed by: FAMILY MEDICINE

## 2022-12-20 RX ORDER — BUPROPION HYDROCHLORIDE 150 MG/1
150 TABLET ORAL EVERY MORNING
COMMUNITY
Start: 2022-12-11 | End: 2022-12-20

## 2022-12-20 RX ORDER — OMEGA-3 FATTY ACIDS/FISH OIL 300-500 MG
CAPSULE ORAL AS DIRECTED
COMMUNITY

## 2022-12-20 RX ORDER — BUPROPION HYDROCHLORIDE 300 MG/1
300 TABLET ORAL EVERY MORNING
COMMUNITY
Start: 2022-12-12

## 2022-12-20 RX ORDER — LISINOPRIL 10 MG/1
20 TABLET ORAL DAILY
Qty: 60 TABLET | Refills: 3 | Status: SHIPPED | OUTPATIENT
Start: 2022-12-20 | End: 2023-12-15

## 2023-01-31 ENCOUNTER — OFFICE VISIT (OUTPATIENT)
Dept: FAMILY MEDICINE CLINIC | Facility: CLINIC | Age: 48
End: 2023-01-31
Payer: COMMERCIAL

## 2023-01-31 VITALS
DIASTOLIC BLOOD PRESSURE: 86 MMHG | OXYGEN SATURATION: 98 % | WEIGHT: 176.38 LBS | SYSTOLIC BLOOD PRESSURE: 136 MMHG | BODY MASS INDEX: 30.11 KG/M2 | TEMPERATURE: 98 F | HEIGHT: 64 IN | RESPIRATION RATE: 16 BRPM | HEART RATE: 90 BPM

## 2023-01-31 DIAGNOSIS — G56.02 CARPAL TUNNEL SYNDROME OF LEFT WRIST: ICD-10-CM

## 2023-01-31 DIAGNOSIS — E08.9 DIABETES MELLITUS DUE TO UNDERLYING CONDITION, CONTROLLED, WITHOUT COMPLICATION, WITHOUT LONG-TERM CURRENT USE OF INSULIN (HCC): Primary | ICD-10-CM

## 2023-01-31 PROCEDURE — 90471 IMMUNIZATION ADMIN: CPT | Performed by: FAMILY MEDICINE

## 2023-01-31 PROCEDURE — 90677 PCV20 VACCINE IM: CPT | Performed by: FAMILY MEDICINE

## 2023-01-31 PROCEDURE — 3079F DIAST BP 80-89 MM HG: CPT | Performed by: FAMILY MEDICINE

## 2023-01-31 PROCEDURE — 99214 OFFICE O/P EST MOD 30 MIN: CPT | Performed by: FAMILY MEDICINE

## 2023-01-31 PROCEDURE — 3075F SYST BP GE 130 - 139MM HG: CPT | Performed by: FAMILY MEDICINE

## 2023-01-31 PROCEDURE — 3008F BODY MASS INDEX DOCD: CPT | Performed by: FAMILY MEDICINE

## 2023-01-31 RX ORDER — METFORMIN HYDROCHLORIDE 500 MG/1
500 TABLET, EXTENDED RELEASE ORAL DAILY
Qty: 30 TABLET | Refills: 3 | Status: SHIPPED | OUTPATIENT
Start: 2023-01-31

## 2023-01-31 NOTE — PATIENT INSTRUCTIONS
Dr. Stephanie Valladares's \" The immune system recovery plan\". Trial of avoidance of gluten, dairy, soy, corn and sugar x 3 months.

## 2023-02-02 ENCOUNTER — PATIENT MESSAGE (OUTPATIENT)
Dept: FAMILY MEDICINE CLINIC | Facility: CLINIC | Age: 48
End: 2023-02-02

## 2023-02-06 NOTE — TELEPHONE ENCOUNTER
From: Deondre Mcgregor  Sent: 2/5/2023 8:21 PM CST  To: Mary Campos Clinical Staff  Subject: Eye exam is due    I was last seen by my ophthalmologist, Dr. Kaylee Pollard, on October 19, 2022. He is at [facility] in [place], [place].

## 2023-02-08 ENCOUNTER — PATIENT MESSAGE (OUTPATIENT)
Dept: FAMILY MEDICINE CLINIC | Facility: CLINIC | Age: 48
End: 2023-02-08

## 2023-02-09 RX ORDER — BLOOD SUGAR DIAGNOSTIC
STRIP MISCELLANEOUS
Qty: 100 STRIP | Refills: 1 | Status: SHIPPED | OUTPATIENT
Start: 2023-02-09 | End: 2024-02-09

## 2023-02-09 NOTE — TELEPHONE ENCOUNTER
From: Erik Galvez  To: Rashaun Murphy MD  Sent: 2/8/2023 10:26 PM CST  Subject: Test Strips    Do I need a prescription to get test strips for checking my blood sugar? If so, I am out and will need more. Thank you.

## 2023-02-09 NOTE — TELEPHONE ENCOUNTER
Future appt: Your appointments     Date & Time Appointment Department [facility])    Apr 25, 2023  9:00 AM CDT Follow up - Extended with Luciana Krabbe, MD Denis Bearded, [place] ([facility])            [place]  [facility]  [phone]        Last Appointment with provider:   1/31/2023 for diabetes follow up. Last appointment at [facility]:  1/31/2023  Cholesterol, Total (mg/dL)   Date Value   11/16/2022 283 (H)     HDL Cholesterol (mg/dL)   Date Value   11/16/2022 42     LDL Cholesterol (mg/dL)   Date Value   11/16/2022 181 (H)     Triglycerides (mg/dL)   Date Value   11/16/2022 307 (H)     Lab Results   Component Value Date     (H) 11/16/2022    A1C 6.1 (H) 11/16/2022     Lab Results   Component Value Date    T4F 1.0 11/16/2022    TSH 0.743 11/16/2022       No follow-ups on file.

## 2023-02-10 RX ORDER — HYDROCHLOROTHIAZIDE 25 MG/1
25 TABLET ORAL DAILY
Qty: 90 TABLET | Refills: 0 | Status: SHIPPED | OUTPATIENT
Start: 2023-02-10

## 2023-02-10 NOTE — TELEPHONE ENCOUNTER
Future appt: Your appointments     Date & Time Appointment Department [facility])    Apr 25, 2023  9:00 AM CDT Follow up - Extended with Pecola Cowden, MD [address], [place] ([place])            [address], [place]  [facility]  [phone]        Last Appointment with provider:   1/31/2023 for diabetes follow up. [place] [facility]:  1/31/2023  Cholesterol, Total (mg/dL)   Date Value   11/16/2022 283 (H)     HDL Cholesterol (mg/dL)   Date Value   11/16/2022 42     LDL Cholesterol (mg/dL)   Date Value   11/16/2022 181 (H)     Triglycerides (mg/dL)   Date Value   11/16/2022 307 (H)     Lab Results   Component Value Date     (H) 11/16/2022    A1C 6.1 (H) 11/16/2022     Lab Results   Component Value Date    T4F 1.0 11/16/2022    TSH 0.743 11/16/2022       No follow-ups on file.

## 2023-02-15 ENCOUNTER — TELEPHONE (OUTPATIENT)
Dept: FAMILY MEDICINE CLINIC | Facility: CLINIC | Age: 48
End: 2023-02-15

## 2023-02-15 RX ORDER — BLOOD SUGAR DIAGNOSTIC
STRIP MISCELLANEOUS
Qty: 100 STRIP | Refills: 1 | Status: SHIPPED | OUTPATIENT
Start: 2023-02-15 | End: 2024-02-15

## 2023-04-23 ENCOUNTER — PATIENT MESSAGE (OUTPATIENT)
Dept: FAMILY MEDICINE CLINIC | Facility: CLINIC | Age: 48
End: 2023-04-23

## 2023-04-24 DIAGNOSIS — E78.49 FAMILIAL MULTIPLE LIPOPROTEIN-TYPE HYPERLIPIDEMIA: ICD-10-CM

## 2023-04-24 RX ORDER — METFORMIN HYDROCHLORIDE 500 MG/1
500 TABLET, EXTENDED RELEASE ORAL DAILY
Qty: 30 TABLET | Refills: 3 | Status: SHIPPED | OUTPATIENT
Start: 2023-04-24

## 2023-04-24 RX ORDER — ROSUVASTATIN CALCIUM 5 MG/1
5 TABLET, COATED ORAL NIGHTLY
Qty: 30 TABLET | Refills: 3 | Status: SHIPPED | OUTPATIENT
Start: 2023-04-24

## 2023-04-24 RX ORDER — HYDROCHLOROTHIAZIDE 25 MG/1
25 TABLET ORAL DAILY
Qty: 90 TABLET | Refills: 0 | Status: SHIPPED | OUTPATIENT
Start: 2023-04-24

## 2023-04-24 NOTE — TELEPHONE ENCOUNTER
From: Veronica Muhammad  To: Orville Gudino MD  Sent: 4/23/2023 8:32 AM CDT  Subject: Dexcom sensor/patch    I would like to discuss the possibility of a patch (like Dexcom or something similar) instead of finger pricking at my appointment on 4/25. Thank you!

## 2023-04-24 NOTE — TELEPHONE ENCOUNTER
Future appt: Your appointments     Date & Time Appointment Department [facility])    Apr 25, 2023  9:00 AM CDT Follow up - Extended with Skip Strong MD [address] [facility], [place] ([facility] [place])            [address], [place]  [facility]  [phone]        Last Appointment with provider:   1/31/2023 for diabetes follow up. Last appointment at [facility]:  1/31/2023  Cholesterol, Total (mg/dL)   Date Value   11/16/2022 283 (H)     HDL Cholesterol (mg/dL)   Date Value   11/16/2022 42     LDL Cholesterol (mg/dL)   Date Value   11/16/2022 181 (H)     Triglycerides (mg/dL)   Date Value   11/16/2022 307 (H)     Lab Results   Component Value Date     (H) 11/16/2022    A1C 6.1 (H) 11/16/2022     Lab Results   Component Value Date    T4F 1.0 11/16/2022    TSH 0.743 11/16/2022       No follow-ups on file.

## 2023-04-25 ENCOUNTER — LAB ENCOUNTER (OUTPATIENT)
Dept: LAB | Age: 48
End: 2023-04-25
Attending: FAMILY MEDICINE
Payer: COMMERCIAL

## 2023-04-25 ENCOUNTER — OFFICE VISIT (OUTPATIENT)
Dept: FAMILY MEDICINE CLINIC | Facility: CLINIC | Age: 48
End: 2023-04-25
Payer: COMMERCIAL

## 2023-04-25 VITALS
DIASTOLIC BLOOD PRESSURE: 68 MMHG | HEIGHT: 64 IN | SYSTOLIC BLOOD PRESSURE: 128 MMHG | OXYGEN SATURATION: 98 % | BODY MASS INDEX: 28.51 KG/M2 | WEIGHT: 167 LBS | RESPIRATION RATE: 18 BRPM | TEMPERATURE: 97 F | HEART RATE: 87 BPM

## 2023-04-25 DIAGNOSIS — I10 ESSENTIAL HYPERTENSION: ICD-10-CM

## 2023-04-25 DIAGNOSIS — E53.8 B12 DEFICIENCY: ICD-10-CM

## 2023-04-25 DIAGNOSIS — E78.49 FAMILIAL MULTIPLE LIPOPROTEIN-TYPE HYPERLIPIDEMIA: Primary | ICD-10-CM

## 2023-04-25 DIAGNOSIS — E08.9 DIABETES MELLITUS DUE TO UNDERLYING CONDITION, CONTROLLED, WITHOUT COMPLICATION, WITHOUT LONG-TERM CURRENT USE OF INSULIN (HCC): ICD-10-CM

## 2023-04-25 DIAGNOSIS — R73.03 PREDIABETES: ICD-10-CM

## 2023-04-25 DIAGNOSIS — E78.49 FAMILIAL MULTIPLE LIPOPROTEIN-TYPE HYPERLIPIDEMIA: ICD-10-CM

## 2023-04-25 DIAGNOSIS — E55.9 VITAMIN D DEFICIENCY: ICD-10-CM

## 2023-04-25 LAB
ALBUMIN SERPL-MCNC: 4.1 G/DL (ref 3.4–5)
ALBUMIN/GLOB SERPL: 1.1 {RATIO} (ref 1–2)
ALP LIVER SERPL-CCNC: 67 U/L
ALT SERPL-CCNC: 34 U/L
ANION GAP SERPL CALC-SCNC: 4 MMOL/L (ref 0–18)
AST SERPL-CCNC: 23 U/L (ref 15–37)
BASOPHILS # BLD AUTO: 0.05 X10(3) UL (ref 0–0.2)
BASOPHILS NFR BLD AUTO: 0.5 %
BILIRUB SERPL-MCNC: 0.4 MG/DL (ref 0.1–2)
BILIRUB UR QL STRIP.AUTO: NEGATIVE
BUN BLD-MCNC: 12 MG/DL (ref 7–18)
CALCIUM BLD-MCNC: 10 MG/DL (ref 8.5–10.1)
CHLORIDE SERPL-SCNC: 102 MMOL/L (ref 98–112)
CHOLEST SERPL-MCNC: 182 MG/DL (ref ?–200)
CK SERPL-CCNC: 110 U/L
CLARITY UR REFRACT.AUTO: CLEAR
CO2 SERPL-SCNC: 30 MMOL/L (ref 21–32)
COLOR UR AUTO: YELLOW
CREAT BLD-MCNC: 0.91 MG/DL
CREAT UR-SCNC: 237 MG/DL
DEPRECATED HBV CORE AB SER IA-ACNC: 80.1 NG/ML
EOSINOPHIL # BLD AUTO: 0.19 X10(3) UL (ref 0–0.7)
EOSINOPHIL NFR BLD AUTO: 2 %
ERYTHROCYTE [DISTWIDTH] IN BLOOD BY AUTOMATED COUNT: 13.4 %
EST. AVERAGE GLUCOSE BLD GHB EST-MCNC: 117 MG/DL (ref 68–126)
FASTING PATIENT LIPID ANSWER: YES
FASTING STATUS PATIENT QL REPORTED: YES
GFR SERPLBLD BASED ON 1.73 SQ M-ARVRAT: 78 ML/MIN/1.73M2 (ref 60–?)
GLOBULIN PLAS-MCNC: 3.6 G/DL (ref 2.8–4.4)
GLUCOSE BLD-MCNC: 109 MG/DL (ref 70–99)
GLUCOSE UR STRIP.AUTO-MCNC: NEGATIVE MG/DL
HBA1C MFR BLD: 5.7 % (ref ?–5.7)
HCT VFR BLD AUTO: 41 %
HDLC SERPL-MCNC: 49 MG/DL (ref 40–59)
HGB BLD-MCNC: 13.5 G/DL
HYALINE CASTS #/AREA URNS AUTO: PRESENT /LPF
IMM GRANULOCYTES # BLD AUTO: 0.03 X10(3) UL (ref 0–1)
IMM GRANULOCYTES NFR BLD: 0.3 %
IRON SATN MFR SERPL: 20 %
IRON SERPL-MCNC: 83 UG/DL
KETONES UR STRIP.AUTO-MCNC: NEGATIVE MG/DL
LDLC SERPL CALC-MCNC: 90 MG/DL (ref ?–100)
LEUKOCYTE ESTERASE UR QL STRIP.AUTO: NEGATIVE
LYMPHOCYTES # BLD AUTO: 2.08 X10(3) UL (ref 1–4)
LYMPHOCYTES NFR BLD AUTO: 22.3 %
MAGNESIUM SERPL-MCNC: 2.2 MG/DL (ref 1.6–2.6)
MCH RBC QN AUTO: 28.6 PG (ref 26–34)
MCHC RBC AUTO-ENTMCNC: 32.9 G/DL (ref 31–37)
MCV RBC AUTO: 86.9 FL
MICROALBUMIN UR-MCNC: 0.98 MG/DL
MICROALBUMIN/CREAT 24H UR-RTO: 4.1 UG/MG (ref ?–30)
MONOCYTES # BLD AUTO: 0.57 X10(3) UL (ref 0.1–1)
MONOCYTES NFR BLD AUTO: 6.1 %
NEUTROPHILS # BLD AUTO: 6.4 X10 (3) UL (ref 1.5–7.7)
NEUTROPHILS # BLD AUTO: 6.4 X10(3) UL (ref 1.5–7.7)
NEUTROPHILS NFR BLD AUTO: 68.8 %
NITRITE UR QL STRIP.AUTO: NEGATIVE
NONHDLC SERPL-MCNC: 133 MG/DL (ref ?–130)
OSMOLALITY SERPL CALC.SUM OF ELEC: 282 MOSM/KG (ref 275–295)
PH UR STRIP.AUTO: 7 [PH] (ref 5–8)
PLATELET # BLD AUTO: 292 10(3)UL (ref 150–450)
POTASSIUM SERPL-SCNC: 3.7 MMOL/L (ref 3.5–5.1)
PROT SERPL-MCNC: 7.7 G/DL (ref 6.4–8.2)
PROT UR STRIP.AUTO-MCNC: NEGATIVE MG/DL
RBC # BLD AUTO: 4.72 X10(6)UL
RBC UR QL AUTO: NEGATIVE
SODIUM SERPL-SCNC: 136 MMOL/L (ref 136–145)
SP GR UR STRIP.AUTO: 1.02 (ref 1–1.03)
TIBC SERPL-MCNC: 410 UG/DL (ref 240–450)
TRANSFERRIN SERPL-MCNC: 275 MG/DL (ref 200–360)
TRIGL SERPL-MCNC: 258 MG/DL (ref 30–149)
TSI SER-ACNC: 1.42 MIU/ML (ref 0.36–3.74)
URATE SERPL-MCNC: 4.6 MG/DL
UROBILINOGEN UR STRIP.AUTO-MCNC: <2 MG/DL
VIT B12 SERPL-MCNC: 641 PG/ML (ref 193–986)
VIT D+METAB SERPL-MCNC: 36.9 NG/ML (ref 30–100)
VLDLC SERPL CALC-MCNC: 42 MG/DL (ref 0–30)
WBC # BLD AUTO: 9.3 X10(3) UL (ref 4–11)

## 2023-04-25 PROCEDURE — 3008F BODY MASS INDEX DOCD: CPT | Performed by: FAMILY MEDICINE

## 2023-04-25 PROCEDURE — 81001 URINALYSIS AUTO W/SCOPE: CPT | Performed by: FAMILY MEDICINE

## 2023-04-25 PROCEDURE — 82306 VITAMIN D 25 HYDROXY: CPT | Performed by: FAMILY MEDICINE

## 2023-04-25 PROCEDURE — 84550 ASSAY OF BLOOD/URIC ACID: CPT | Performed by: FAMILY MEDICINE

## 2023-04-25 PROCEDURE — 80050 GENERAL HEALTH PANEL: CPT | Performed by: FAMILY MEDICINE

## 2023-04-25 PROCEDURE — 3074F SYST BP LT 130 MM HG: CPT | Performed by: FAMILY MEDICINE

## 2023-04-25 PROCEDURE — 82607 VITAMIN B-12: CPT | Performed by: FAMILY MEDICINE

## 2023-04-25 PROCEDURE — 83550 IRON BINDING TEST: CPT | Performed by: FAMILY MEDICINE

## 2023-04-25 PROCEDURE — 82550 ASSAY OF CK (CPK): CPT | Performed by: FAMILY MEDICINE

## 2023-04-25 PROCEDURE — 80061 LIPID PANEL: CPT | Performed by: FAMILY MEDICINE

## 2023-04-25 PROCEDURE — 82570 ASSAY OF URINE CREATININE: CPT | Performed by: FAMILY MEDICINE

## 2023-04-25 PROCEDURE — 82043 UR ALBUMIN QUANTITATIVE: CPT | Performed by: FAMILY MEDICINE

## 2023-04-25 PROCEDURE — 83735 ASSAY OF MAGNESIUM: CPT | Performed by: FAMILY MEDICINE

## 2023-04-25 PROCEDURE — 99214 OFFICE O/P EST MOD 30 MIN: CPT | Performed by: FAMILY MEDICINE

## 2023-04-25 PROCEDURE — 3078F DIAST BP <80 MM HG: CPT | Performed by: FAMILY MEDICINE

## 2023-04-25 PROCEDURE — 83540 ASSAY OF IRON: CPT | Performed by: FAMILY MEDICINE

## 2023-04-25 PROCEDURE — 82728 ASSAY OF FERRITIN: CPT | Performed by: FAMILY MEDICINE

## 2023-04-25 PROCEDURE — 83036 HEMOGLOBIN GLYCOSYLATED A1C: CPT | Performed by: FAMILY MEDICINE

## 2023-04-25 RX ORDER — SEMAGLUTIDE 1.34 MG/ML
0.25 INJECTION, SOLUTION SUBCUTANEOUS
Qty: 1 EACH | Refills: 3 | Status: SHIPPED | OUTPATIENT
Start: 2023-04-25

## 2023-04-25 RX ORDER — PEN NEEDLE, DIABETIC 30 GX3/16"
1 NEEDLE, DISPOSABLE MISCELLANEOUS
Qty: 30 EACH | Refills: 0 | Status: SHIPPED | OUTPATIENT
Start: 2023-04-25

## 2023-04-25 RX ORDER — BUPROPION HYDROCHLORIDE 150 MG/1
150 TABLET ORAL DAILY
COMMUNITY
Start: 2023-04-11

## 2023-04-25 NOTE — PATIENT INSTRUCTIONS
Hold Crestor for 1 week. [employer]  (0528 access number) for vitamins   Coenzyme Q10   Take daily and then restart crestor.

## 2023-05-23 ENCOUNTER — OFFICE VISIT (OUTPATIENT)
Dept: FAMILY MEDICINE CLINIC | Facility: CLINIC | Age: 48
End: 2023-05-23
Payer: COMMERCIAL

## 2023-05-23 ENCOUNTER — PATIENT MESSAGE (OUTPATIENT)
Dept: FAMILY MEDICINE CLINIC | Facility: CLINIC | Age: 48
End: 2023-05-23

## 2023-05-23 VITALS
DIASTOLIC BLOOD PRESSURE: 68 MMHG | SYSTOLIC BLOOD PRESSURE: 124 MMHG | RESPIRATION RATE: 16 BRPM | HEART RATE: 88 BPM | HEIGHT: 64 IN | TEMPERATURE: 98 F | BODY MASS INDEX: 28.06 KG/M2 | OXYGEN SATURATION: 98 % | WEIGHT: 164.38 LBS

## 2023-05-23 DIAGNOSIS — E08.9 DIABETES MELLITUS DUE TO UNDERLYING CONDITION, CONTROLLED, WITHOUT COMPLICATION, WITHOUT LONG-TERM CURRENT USE OF INSULIN (HCC): Primary | ICD-10-CM

## 2023-05-23 RX ORDER — SEMAGLUTIDE 1.34 MG/ML
0.5 INJECTION, SOLUTION SUBCUTANEOUS
Qty: 1 EACH | Refills: 3 | Status: SHIPPED | OUTPATIENT
Start: 2023-05-23

## 2023-05-23 NOTE — TELEPHONE ENCOUNTER
From: Chela Chun  To: Cristian Gomez MD  Sent: 5/23/2023 9:46 AM CDT  Subject: Average glucose results     For the last 30 days.

## 2023-06-09 RX ORDER — LISINOPRIL 10 MG/1
20 TABLET ORAL DAILY
Qty: 60 TABLET | Refills: 3 | Status: SHIPPED | OUTPATIENT
Start: 2023-06-09 | End: 2024-06-03

## 2023-06-09 NOTE — TELEPHONE ENCOUNTER
Future appt: Your appointments     Date & Time Appointment Department [facility])    Jun 19, 2023  3:40 PM CDT Follow Up Visit with Jeni Newton MD [address], [place] ([facility] [place])            [address], [place] [facility] [id number]  [phone]        Last Appointment with provider:   5/23/2023 for diabetes follow up, medication follow up. Last appointment at [facility]:  5/23/2023  Cholesterol, Total (mg/dL)   Date Value   04/25/2023 182     HDL Cholesterol (mg/dL)   Date Value   04/25/2023 49     LDL Cholesterol (mg/dL)   Date Value   04/25/2023 90     Triglycerides (mg/dL)   Date Value   04/25/2023 258 (H)     Lab Results   Component Value Date     04/25/2023    A1C 5.7 (H) 04/25/2023     Lab Results   Component Value Date    T4F 1.0 11/16/2022    TSH 1.420 04/25/2023       No follow-ups on file.

## 2023-06-19 ENCOUNTER — OFFICE VISIT (OUTPATIENT)
Dept: FAMILY MEDICINE CLINIC | Facility: CLINIC | Age: 48
End: 2023-06-19
Payer: COMMERCIAL

## 2023-06-19 VITALS
OXYGEN SATURATION: 99 % | BODY MASS INDEX: 27.38 KG/M2 | HEIGHT: 64 IN | DIASTOLIC BLOOD PRESSURE: 62 MMHG | WEIGHT: 160.38 LBS | TEMPERATURE: 98 F | HEART RATE: 90 BPM | RESPIRATION RATE: 16 BRPM | SYSTOLIC BLOOD PRESSURE: 114 MMHG

## 2023-06-19 DIAGNOSIS — I10 ESSENTIAL HYPERTENSION: ICD-10-CM

## 2023-06-19 DIAGNOSIS — E08.9 DIABETES MELLITUS DUE TO UNDERLYING CONDITION, CONTROLLED, WITHOUT COMPLICATION, WITHOUT LONG-TERM CURRENT USE OF INSULIN (HCC): Primary | ICD-10-CM

## 2023-06-19 RX ORDER — SEMAGLUTIDE 0.68 MG/ML
0.5 INJECTION, SOLUTION SUBCUTANEOUS
COMMUNITY
Start: 2023-05-23

## 2023-06-26 NOTE — TELEPHONE ENCOUNTER
Future appt: Your appointments       Date & Time Appointment Department [facility])    Jul 27, 2023  3:40 PM CDT Follow Up Visit with Bree Melivn MD [address] [place] [address] ([facility])              [address] [place] [address] [facility] [address]  [phone]          Last Appointment with provider:   6/19/2023 diabetic follow up  Last appointment at [facility]:  6/19/2023  Cholesterol, Total (mg/dL)   Date Value   04/25/2023 182     HDL Cholesterol (mg/dL)   Date Value   04/25/2023 49     LDL Cholesterol (mg/dL)   Date Value   04/25/2023 90     Triglycerides (mg/dL)   Date Value   04/25/2023 258 (H)     Lab Results   Component Value Date     04/25/2023    A1C 5.7 (H) 04/25/2023     Lab Results   Component Value Date    T4F 1.0 11/16/2022    TSH 1.420 04/25/2023       No follow-ups on file.

## 2023-07-17 DIAGNOSIS — E78.49 FAMILIAL MULTIPLE LIPOPROTEIN-TYPE HYPERLIPIDEMIA: ICD-10-CM

## 2023-07-17 RX ORDER — METFORMIN HYDROCHLORIDE 500 MG/1
500 TABLET, EXTENDED RELEASE ORAL DAILY
Qty: 90 TABLET | Refills: 0 | Status: SHIPPED | OUTPATIENT
Start: 2023-07-17

## 2023-07-17 RX ORDER — ROSUVASTATIN CALCIUM 5 MG/1
5 TABLET, COATED ORAL NIGHTLY
Qty: 30 TABLET | Refills: 0 | Status: SHIPPED | OUTPATIENT
Start: 2023-07-17 | End: 2023-07-17

## 2023-07-17 RX ORDER — METFORMIN HYDROCHLORIDE 500 MG/1
500 TABLET, EXTENDED RELEASE ORAL DAILY
Qty: 30 TABLET | Refills: 0 | Status: SHIPPED | OUTPATIENT
Start: 2023-07-17 | End: 2023-07-17

## 2023-07-17 RX ORDER — ROSUVASTATIN CALCIUM 5 MG/1
5 TABLET, COATED ORAL NIGHTLY
Qty: 90 TABLET | Refills: 0 | Status: SHIPPED | OUTPATIENT
Start: 2023-07-17

## 2023-07-17 NOTE — TELEPHONE ENCOUNTER
Future appt: Your appointments       Date & Time Appointment Department [facility])    Jul 27, 2023  3:40 PM CDT Follow Up Visit with Rani Concepcion MD [address] [place] [address], [place] ([facility] [place])              [address] [place] [address] [place]  [facility] [address] [id number]  [phone]          Last Appointment with provider:   6/19/2023  Last appointment at [facility]:  6/19/2023  Cholesterol, Total (mg/dL)   Date Value   04/25/2023 182     HDL Cholesterol (mg/dL)   Date Value   04/25/2023 49     LDL Cholesterol (mg/dL)   Date Value   04/25/2023 90     Triglycerides (mg/dL)   Date Value   04/25/2023 258 (H)     Lab Results   Component Value Date     04/25/2023    A1C 5.7 (H) 04/25/2023     Lab Results   Component Value Date    T4F 1.0 11/16/2022    TSH 1.420 04/25/2023     Last RF:  4/24/2023    No follow-ups on file.

## 2023-07-27 ENCOUNTER — PATIENT MESSAGE (OUTPATIENT)
Dept: FAMILY MEDICINE CLINIC | Facility: CLINIC | Age: 48
End: 2023-07-27

## 2023-07-27 ENCOUNTER — OFFICE VISIT (OUTPATIENT)
Dept: FAMILY MEDICINE CLINIC | Facility: CLINIC | Age: 48
End: 2023-07-27
Payer: COMMERCIAL

## 2023-07-27 VITALS
SYSTOLIC BLOOD PRESSURE: 118 MMHG | RESPIRATION RATE: 16 BRPM | WEIGHT: 154.19 LBS | BODY MASS INDEX: 26.32 KG/M2 | HEIGHT: 64 IN | HEART RATE: 87 BPM | OXYGEN SATURATION: 98 % | TEMPERATURE: 98 F | DIASTOLIC BLOOD PRESSURE: 74 MMHG

## 2023-07-27 DIAGNOSIS — I10 ESSENTIAL HYPERTENSION: ICD-10-CM

## 2023-07-27 DIAGNOSIS — E08.9 DIABETES MELLITUS DUE TO UNDERLYING CONDITION, CONTROLLED, WITHOUT COMPLICATION, WITHOUT LONG-TERM CURRENT USE OF INSULIN (HCC): Primary | ICD-10-CM

## 2023-07-27 PROCEDURE — 99214 OFFICE O/P EST MOD 30 MIN: CPT | Performed by: FAMILY MEDICINE

## 2023-07-27 PROCEDURE — 3074F SYST BP LT 130 MM HG: CPT | Performed by: FAMILY MEDICINE

## 2023-07-27 PROCEDURE — 3008F BODY MASS INDEX DOCD: CPT | Performed by: FAMILY MEDICINE

## 2023-07-27 PROCEDURE — 3078F DIAST BP <80 MM HG: CPT | Performed by: FAMILY MEDICINE

## 2023-07-27 RX ORDER — PEN NEEDLE, DIABETIC 30 GX3/16"
1 NEEDLE, DISPOSABLE MISCELLANEOUS
Qty: 30 EACH | Refills: 0 | Status: SHIPPED | OUTPATIENT
Start: 2023-07-27

## 2023-07-27 NOTE — TELEPHONE ENCOUNTER
From: Lia Peralta  To: Flakita Padilla MD  Sent: 7/27/2023 12:41 PM CDT  Subject: Blood Glucose for last 30 days. Please see attachments.

## 2023-07-27 NOTE — PATIENT INSTRUCTIONS
Tracking weight, tracking food and tracking steps will hep with weight loss journey. Follow up in 3 months.

## 2024-05-08 NOTE — TELEPHONE ENCOUNTER
Left message that strep was negative. Pt was also instructed that strep was negative and call if not better. Pt. No show for [facility].  Called and spoke to family and family admits will call back to re schedule.

## (undated) DIAGNOSIS — E78.49 FAMILIAL MULTIPLE LIPOPROTEIN-TYPE HYPERLIPIDEMIA: ICD-10-CM

## (undated) NOTE — LETTER
02/03/22      Hamilton Calvin  [address] [facility] [address]      Dear Estiven Padron,    [address] [facility] records indicate that you have outstanding lab work and or testing that was ordered for you and has not yet been completed:        CBC With Differential With Platelet

## (undated) NOTE — LETTER
10/21/20        Madison Ferris  [address]      Dear Manuel Amaya,    [address] [facility] records indicate that you have outstanding lab work and or testing that was ordered for you and has not yet been completed:  Orders Placed This Encounter      CK Mally Naylor

## (undated) NOTE — LETTER
05/12/21        Hamilton Calvin  [address] [facility] [address]      Dear Estiven Padron,    [address] [facility] records indicate that you have outstanding lab work and or testing that was ordered for you and has not yet been completed:  Orders Placed This Encounter      JERMAIN JuddC

## (undated) NOTE — MR AVS SNAPSHOT
[address]  [place] [address] [address]  [phone]               Thank you for choosing us for your health care visit with Gary Chanel MD.  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 Fully enjoy your food when eating. Don’t eat while distracted and slow down. Avoid over sized portions. Don’t eat while when you’re bored.      EAT THESE FOODS MORE OFTEN: EAT T

## (undated) NOTE — LETTER
05/02/18        Chamberino Hunger  [address]      Dear Ro Mccray,    [address] [facility] records indicate that you have outstanding lab work and or testing that was ordered for you and has not yet been completed:          Hpv Dna  High Risk , Rachel Greenwood

## (undated) NOTE — LETTER
10/20/17        Theador Apley  [address]      Dear Sergio Seaman,    [address] [facility] records indicate that you have outstanding lab work and or testing that was ordered for you and has not yet been completed:          Vitamin D, 25-Hydroxy [E]  To provid